# Patient Record
Sex: MALE | Race: WHITE | NOT HISPANIC OR LATINO
[De-identification: names, ages, dates, MRNs, and addresses within clinical notes are randomized per-mention and may not be internally consistent; named-entity substitution may affect disease eponyms.]

---

## 2019-03-21 ENCOUNTER — TRANSCRIPTION ENCOUNTER (OUTPATIENT)
Age: 54
End: 2019-03-21

## 2020-11-17 ENCOUNTER — EMERGENCY (EMERGENCY)
Facility: HOSPITAL | Age: 55
LOS: 1 days | Discharge: HOME | End: 2020-11-17
Admitting: EMERGENCY MEDICINE
Payer: COMMERCIAL

## 2020-11-17 VITALS
TEMPERATURE: 98 F | DIASTOLIC BLOOD PRESSURE: 90 MMHG | OXYGEN SATURATION: 98 % | HEART RATE: 85 BPM | RESPIRATION RATE: 18 BRPM | SYSTOLIC BLOOD PRESSURE: 133 MMHG

## 2020-11-17 PROCEDURE — 99283 EMERGENCY DEPT VISIT LOW MDM: CPT

## 2020-11-17 NOTE — ED PROVIDER NOTE - TEMPLATE
Pt brought to ED by ambulance after being found down by a neighbor. Per EMS, pt left a suicide note and reportedly took one week's worth of medication, including diltiazem and carvedilol. Upon arrival to the ED, his heartrate is 35 and initial BP is 68/45. He is lethargic and minimally responsive.   
Psych/Behavioral

## 2020-11-17 NOTE — ED PROVIDER NOTE - PATIENT PORTAL LINK FT
You can access the FollowMyHealth Patient Portal offered by [facility] by registering at the following website: [email or web address]. By joining [employer]’s FollowMyHealth portal, you will also be able to view your health information using other applications (apps) compatible with our system.

## 2020-11-17 NOTE — ED ADULT NURSE NOTE - OBJECTIVE STATEMENT
Pt reports feeling "desperate."  Pt. recently discharged form [facility] (11/16) and he states that he lied to get discharged but he's not well. Pt. is alert and oriented x3.  He has a follow-up appointment with his psychiatrist on 11/18.  Denies any substance use, alcohol, AH/VH, SI/HI.

## 2020-11-17 NOTE — ED ADULT TRIAGE NOTE - CHIEF COMPLAINT QUOTE
p/t with hx bipolar, c/o of increased depression and SI for few days no plan, p/t appears anxious, cooperative

## 2020-11-17 NOTE — ED PROVIDER NOTE - OBJECTIVE STATEMENT
This is a 55 yr old M, pmh bipolar disorder with c/o anxiety and depression. Pt states he just not feeling well, he is desperate to be admitted again. He endorsees he was discharged from [facility] for inpatient psychiatric unit. He states he fooled them, he told he is feeling better but in fact he does not. He states he went home and realized he is just not himself. Pt endorsees he has an out patient [facility]. Denies SI/HI/AH/VH. Denies falling, punching or kicking any objects. Denies pain, SOB, fever, chills, chest and abdominal discomfort. Denies recent use of alcohol or illicit drug. No evidence of physical injuries.

## 2020-11-17 NOTE — ED PROVIDER NOTE - NSFOLLOWUPINSTRUCTIONS_ED_ALL_ED_FT
Please keep your follow up appointment tomorrow with [facility].     Anxiety    Generalized anxiety disorder (GIFTY) is a mental disorder. It is defined as anxiety that is not necessarily related to specific events or activities or is out of proportion to said events. Symptoms include restlessness, fatigue, difficulty concentrations, irritability and difficulty concentrating. It may interfere with life functions, including relationships, work, and school. If you were started on a medication, make sure to take exactly as prescribed and follow up with a psychiatrist.    SEEK IMMEDIATE MEDICAL CARE IF YOU HAVE ANY OF THE FOLLOWING SYMPTOMS: thoughts about hurting killing yourself, thoughts about hurting or killing somebody else, hallucinations, or worsening depression.    Depression    Depression is a mental illness that usually causes feelings of sadness, hopelessness, or helplessness. Some people with this disorder do not feel particularly sad but lose interest in doing things they used to enjoy. Major depressive disorder also can cause physical symptoms. It can interfere with work, school, relationships, and other normal everyday activities. If you were started on a medication, make sure to take exactly as prescribed and follow up with a psychiatrist.    SEEK IMMEDIATE MEDICAL CARE IF YOU HAVE ANY OF THE FOLLOWING SYMPTOMS: thoughts about hurting or killing yourself, thoughts about hurting or killing somebody else, hallucinations, or worsening depression.

## 2020-11-17 NOTE — ED PROVIDER NOTE - CARE PLAN
Principal Discharge DX:	Anxiety  Secondary Diagnosis:	Depression  Secondary Diagnosis:	Bipolar disorder

## 2020-11-17 NOTE — ED BEHAVIORAL HEALTH NOTE - BEHAVIORAL HEALTH NOTE
As per ROSA Rios, Worker called patient's wife Lynette [phone] and left multiple messages for call back.

## 2020-11-17 NOTE — ED ADULT NURSE REASSESSMENT NOTE - NS ED NURSE REASSESS COMMENT FT1
Pt is being discharged to [facility].  Awaiting daughter for transport.  Pt. received discharge instructions.
16

## 2020-11-17 NOTE — ED BEHAVIORAL HEALTH NOTE - BEHAVIORAL HEALTH NOTE
As per ROSA Rios, Worker called patient’s family for collateral information. Worker called patient’s wife Lynette ([phone]) who then directed worker to call patient’s daughter.  Worker then called patient’s daughter Dede Akins ([phone]).    Patient is a 55 year old female, domiciled with wife and adult children, with a diagnosis of Bipolar disorder and MDD, discharged yesterday at [facility], BIB accompanied by daughter for depression.  Patient has been in and out of the hospital over the course of a couple months. Patient was admitted to [facility] for two weeks in July and discharged at the beginning of August. Patient was at a manic state then and was readmitted to [facility] from the ending of August to Mid-September.  After this discharge patient was in a depressive state and the medication that was prescribed brought him to a depressive state. Patient was hospitalized for suicidal thoughts for a week and a half and was discharged yesterday. Patient was brought back to [facility] where Dr. Tineo instructed him to for to [facility]. Patient presented today with suicidal thoughts to hurt self but had no plan or intent. Patient wanted to go to work today but is currently on leave from work. Patient has no past SA and is not engaging in dangerous behaviors. Patient has not eaten since yesterday afternoon but has been pacing back and forth. Patient has diaibities. Patient was given his injection yesterday while at [facility]. Patient is linked to [facility] and sees a psychiatrist there. Patient received his injection Risperdal ? [facility]. Patient sees a therapist Dr. Bello ([phone]). Case discussed with ROSA Rios. Patient is cleared for discharge. Worker called patient’s daughter and informed that patient is cleared for discharge and is [facility]. Worker informed daughter of patient’s plan for follow up and was also provided with [facility] information. Patient’s daughter will transport patient home. As per ROSA Rios, Worker called patient’s family for collateral information. Worker called patient’s wife Lynette ([phone]) who then directed worker to call patient’s daughter.  Worker then called patient’s daughter Dede Akins ([phone]).    Patient is a 55 year old female, domiciled with wife and adult children, with a diagnosis of Bipolar disorder and MDD, discharged yesterday at [facility], BIB accompanied by daughter for depression.  Patient has been in and out of the hospital over the course of a couple months. Patient was admitted to [facility] for two weeks in July and discharged at the beginning of August. Patient was at a manic state then and was readmitted to [facility] from the ending of August to Mid-September.  After this discharge patient was in a depressive state and the medication that was prescribed brought him to a depressive state. Patient was hospitalized for suicidal thoughts for a week and a half and was discharged yesterday. Patient was brought back to [facility] where Dr. Tineo instructed him to for to [facility]. Patient presented today with suicidal thoughts to hurt self but had no plan or intent. Patient wanted to go to work today but is currently on leave from work. Patient has no past SA and is not engaging in dangerous behaviors. Patient has not eaten since yesterday afternoon but has been pacing back and forth. Patient has diaibities. Patient was given his injection yesterday while at [facility]. Patient is linked to [facility] and sees a psychiatrist there. Patient received his injection Risperdal ? [facility] Patient sees a therapist Dr. Bello ([phone]). Case discussed with ROSA Rios. Patient is cleared for discharge. Worker called patient’s daughter and informed that patient is cleared for discharge and is [facility]. Worker informed daughter of patient’s plan for follow up and was also provided with [facility] information. Patient’s daughter will transport patient home. Worker met daughter outside and spoke with patient and recommended that patient follow up with his psychiatrist.

## 2020-12-02 ENCOUNTER — INPATIENT (INPATIENT)
Facility: HOSPITAL | Age: 55
LOS: 11 days | Discharge: HOME | End: 2020-12-14
Attending: PSYCHIATRY & NEUROLOGY | Admitting: PSYCHIATRY & NEUROLOGY
Payer: COMMERCIAL

## 2020-12-02 VITALS
OXYGEN SATURATION: 97 % | SYSTOLIC BLOOD PRESSURE: 136 MMHG | TEMPERATURE: 97 F | RESPIRATION RATE: 16 BRPM | HEIGHT: 67 IN | HEART RATE: 100 BPM | DIASTOLIC BLOOD PRESSURE: 96 MMHG

## 2020-12-02 DIAGNOSIS — F31.4 BIPOLAR DISORDER, CURRENT EPISODE DEPRESSED, SEVERE, WITHOUT PSYCHOTIC FEATURES: ICD-10-CM

## 2020-12-02 LAB
AMPHET UR-MCNC: NEGATIVE — SIGNIFICANT CHANGE UP
ANION GAP SERPL CALC-SCNC: 18 MMO/L — HIGH (ref 7–14)
APAP SERPL-MCNC: < 15 UG/ML — LOW (ref 15–25)
APPEARANCE UR: CLEAR — SIGNIFICANT CHANGE UP
AST SERPL-CCNC: 18 U/L — SIGNIFICANT CHANGE UP (ref 4–40)
BARBITURATES UR SCN-MCNC: NEGATIVE — SIGNIFICANT CHANGE UP
BASOPHILS # BLD AUTO: 0.01 K/UL — SIGNIFICANT CHANGE UP (ref 0–0.2)
BASOPHILS NFR BLD AUTO: 0.2 % — SIGNIFICANT CHANGE UP (ref 0–2)
BENZODIAZ UR-MCNC: NEGATIVE — SIGNIFICANT CHANGE UP
BILIRUB SERPL-MCNC: 0.9 MG/DL — SIGNIFICANT CHANGE UP (ref 0.2–1.2)
BILIRUB UR-MCNC: NEGATIVE — SIGNIFICANT CHANGE UP
BLOOD UR QL VISUAL: NEGATIVE — SIGNIFICANT CHANGE UP
BUN SERPL-MCNC: 8 MG/DL — SIGNIFICANT CHANGE UP (ref 7–23)
CALCIUM SERPL-MCNC: 9.8 MG/DL — SIGNIFICANT CHANGE UP (ref 8.4–10.5)
CANNABINOIDS UR-MCNC: NEGATIVE — SIGNIFICANT CHANGE UP
CHLORIDE SERPL-SCNC: 98 MMOL/L — SIGNIFICANT CHANGE UP (ref 98–107)
CO2 SERPL-SCNC: 19 MMOL/L — LOW (ref 22–31)
COCAINE METAB.OTHER UR-MCNC: NEGATIVE — SIGNIFICANT CHANGE UP
COLOR SPEC: COLORLESS — SIGNIFICANT CHANGE UP
EOSINOPHIL # BLD AUTO: 0.13 K/UL — SIGNIFICANT CHANGE UP (ref 0–0.5)
EOSINOPHIL NFR BLD AUTO: 2.7 % — SIGNIFICANT CHANGE UP (ref 0–6)
ETHANOL BLD-MCNC: < 10 MG/DL — SIGNIFICANT CHANGE UP
GLUCOSE SERPL-MCNC: 149 MG/DL — HIGH (ref 70–99)
GLUCOSE UR-MCNC: NEGATIVE — SIGNIFICANT CHANGE UP
HCT VFR BLD CALC: 40.2 % — SIGNIFICANT CHANGE UP (ref 39–50)
HGB BLD-MCNC: 14.1 G/DL — SIGNIFICANT CHANGE UP (ref 13–17)
IMM GRANULOCYTES NFR BLD AUTO: 0.4 % — SIGNIFICANT CHANGE UP (ref 0–1.5)
KETONES UR-MCNC: NEGATIVE — SIGNIFICANT CHANGE UP
LEUKOCYTE ESTERASE UR-ACNC: NEGATIVE — SIGNIFICANT CHANGE UP
LYMPHOCYTES # BLD AUTO: 0.95 K/UL — LOW (ref 1–3.3)
LYMPHOCYTES # BLD AUTO: 19.7 % — SIGNIFICANT CHANGE UP (ref 13–44)
MCHC RBC-ENTMCNC: 28.7 PG — SIGNIFICANT CHANGE UP (ref 27–34)
MCHC RBC-ENTMCNC: 35.1 % — SIGNIFICANT CHANGE UP (ref 32–36)
MCV RBC AUTO: 81.9 FL — SIGNIFICANT CHANGE UP (ref 80–100)
METHADONE UR-MCNC: NEGATIVE — SIGNIFICANT CHANGE UP
MONOCYTES # BLD AUTO: 0.41 K/UL — SIGNIFICANT CHANGE UP (ref 0–0.9)
MONOCYTES NFR BLD AUTO: 8.5 % — SIGNIFICANT CHANGE UP (ref 2–14)
NEUTROPHILS # BLD AUTO: 3.3 K/UL — SIGNIFICANT CHANGE UP (ref 1.8–7.4)
NEUTROPHILS NFR BLD AUTO: 68.5 % — SIGNIFICANT CHANGE UP (ref 43–77)
NITRITE UR-MCNC: NEGATIVE — SIGNIFICANT CHANGE UP
NRBC # FLD: 0 K/UL — SIGNIFICANT CHANGE UP (ref 0–0)
OPIATES UR-MCNC: NEGATIVE — SIGNIFICANT CHANGE UP
OXYCODONE UR-MCNC: NEGATIVE — SIGNIFICANT CHANGE UP
PCP UR-MCNC: NEGATIVE — SIGNIFICANT CHANGE UP
PH UR: 6.5 — SIGNIFICANT CHANGE UP (ref 5–8)
PLATELET # BLD AUTO: 156 K/UL — SIGNIFICANT CHANGE UP (ref 150–400)
PMV BLD: 9.9 FL — SIGNIFICANT CHANGE UP (ref 7–13)
POTASSIUM SERPL-MCNC: 3.7 MMOL/L — SIGNIFICANT CHANGE UP (ref 3.5–5.3)
POTASSIUM SERPL-SCNC: 3.7 MMOL/L — SIGNIFICANT CHANGE UP (ref 3.5–5.3)
PROT SERPL-MCNC: 6.9 G/DL — SIGNIFICANT CHANGE UP (ref 6–8.3)
PROT UR-MCNC: NEGATIVE — SIGNIFICANT CHANGE UP
RBC # BLD: 4.91 M/UL — SIGNIFICANT CHANGE UP (ref 4.2–5.8)
RBC # FLD: 13.7 % — SIGNIFICANT CHANGE UP (ref 10.3–14.5)
SALICYLATES SERPL-MCNC: < 5 MG/DL — LOW (ref 15–30)
SODIUM SERPL-SCNC: 135 MMOL/L — SIGNIFICANT CHANGE UP (ref 135–145)
SP GR SPEC: 1 — LOW (ref 1–1.04)
UROBILINOGEN FLD QL: NORMAL — SIGNIFICANT CHANGE UP
WBC # BLD: 4.82 K/UL — SIGNIFICANT CHANGE UP (ref 3.8–10.5)
WBC # FLD AUTO: 4.82 K/UL — SIGNIFICANT CHANGE UP (ref 3.8–10.5)

## 2020-12-02 PROCEDURE — 99285 EMERGENCY DEPT VISIT HI MDM: CPT

## 2020-12-02 RX ORDER — BENZTROPINE MESYLATE 1 MG
1 TABLET ORAL AT BEDTIME
Refills: 0 | Status: DISCONTINUED | OUTPATIENT
Start: 2020-12-03 | End: 2020-12-04

## 2020-12-02 RX ORDER — QUETIAPINE FUMARATE 200 MG/1
300 TABLET, FILM COATED ORAL AT BEDTIME
Refills: 0 | Status: DISCONTINUED | OUTPATIENT
Start: 2020-12-03 | End: 2020-12-14

## 2020-12-02 RX ORDER — LAMOTRIGINE 25 MG/1
300 TABLET, ORALLY DISINTEGRATING ORAL AT BEDTIME
Refills: 0 | Status: DISCONTINUED | OUTPATIENT
Start: 2020-12-03 | End: 2020-12-04

## 2020-12-02 RX ORDER — METFORMIN HYDROCHLORIDE 850 MG/1
500 TABLET ORAL
Refills: 0 | Status: DISCONTINUED | OUTPATIENT
Start: 2020-12-03 | End: 2020-12-14

## 2020-12-02 NOTE — ED PROVIDER NOTE - CLINICAL SUMMARY MEDICAL DECISION MAKING FREE TEXT BOX
This is a 55 yr old M, pmh bipolar disorder, depression, DM, with c/o anxiety, sever depression and SI. Pt states he is unable to eat drink , urinate, and very constipated. He feels depress and si "like the whole 9 yard". Pt was here not too long ago with similar c/o after been discharged from .   Abd- soft, non tender, no guarding, positive bowl sounds.   Labs, abd xray, psych

## 2020-12-02 NOTE — ED BEHAVIORAL HEALTH ASSESSMENT NOTE - PSYCHIATRIC ISSUES AND PLAN (INCLUDE STANDING AND PRN MEDICATION)
Defer medication changes to inpatient team; continue Lamictal 300 mg po QHS, Seroquel 300 mg po QHS, Cogentin 1 mg po QHS, Buspar 30 mg po BID, Invega Sustenna 117 mg IM q4 weeks (last received 11/16/20) Defer medication changes to inpatient team; continue Lamictal 300 mg po QHS, Seroquel 300 mg po QHS, Cogentin 1 mg po QHS, Buspar 30 mg po BID, Invega Sustenna 117 mg IM qmonthly weeks (last received 11/16/20)

## 2020-12-02 NOTE — ED BEHAVIORAL HEALTH ASSESSMENT NOTE - HPI (INCLUDE ILLNESS QUALITY, SEVERITY, DURATION, TIMING, CONTEXT, MODIFYING FACTORS, ASSOCIATED SIGNS AND SYMPTOMS)
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    On interview, pt appears anxious and is shaking his leg up and down. He reports increased depression and anxiety for the past month. He denies acute stressors. He reports feeling restless, pacing, and he is somatically preoccupied. He reports barely eating and is sleeping poorly. He reports difficulty focusing, low energy, anhedonia, and hopelessness. For the past few weeks, he reports suicidal ideation with thoughts of jumping in front of a train or drowning himself. He reports ambivalent intent. He denies manic or psychotic symptoms. He denies homicidal or violent ideation, intent, or plan. He reports medication compliance, denies substance use.

## 2020-12-02 NOTE — ED BEHAVIORAL HEALTH ASSESSMENT NOTE - DESCRIPTION
anxious, in good behavioral control  Vital Signs Last 24 Hrs  T(C): 36.2 (02 Dec 2020 21:33), Max: 36.2 (02 Dec 2020 21:33)  T(F): 97.1 (02 Dec 2020 21:33), Max: 97.1 (02 Dec 2020 21:33)  HR: 100 (02 Dec 2020 21:33) (100 - 100)  BP: 136/96 (02 Dec 2020 21:33) (136/96 - 136/96)  BP(mean): --  RR: 16 (02 Dec 2020 21:33) (16 - 16)  SpO2: 97% (02 Dec 2020 21:33) (97% - 97%) Type II DM see HPI

## 2020-12-02 NOTE — ED PROVIDER NOTE - OBJECTIVE STATEMENT
This is a 55 yr old M, pmh bipolar disorder, depression, DM, with c/o anxiety, sever depression and SI. Pt states he is unable to eat drink , urinate, and very constipated. He feels depress and si "like the whole 9 yard". Pt was here not too long ago with similar c/o after been discharged from .

## 2020-12-02 NOTE — ED BEHAVIORAL HEALTH ASSESSMENT NOTE - CURRENT MEDICATION
Lamictal 300 mg po QHS, Seroquel 300 mg po QHS, Cogentin 1 mg po QHS, Buspar 30 mg po BID, Invega Sustenna 117 mg IM q4 weeks (last received 11/16/20), Metformin 500 mg po BID Lamictal 300 mg po QHS, Seroquel 300 mg po QHS, Cogentin 1 mg po QHS, Buspar 30 mg po BID, Invega Sustenna 117 mg IM qmonthly (last received 11/16/20), Metformin 500 mg po BID

## 2020-12-02 NOTE — ED BEHAVIORAL HEALTH ASSESSMENT NOTE - SUMMARY
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 Pt presents an acute danger to self and requires inpatient psychiatric admission for safety and stabilization.

## 2020-12-02 NOTE — ED ADULT NURSE NOTE - OBJECTIVE STATEMENT
Pt received to . Pt presents calm and cooperative; states he has been feeling increasingly depressed to the point where he has to force himself to eat something, lacks motivation and has no interest even in his musical abilities which he formally enjoyed very much; pt also endorsing SI citing he was "thinking about jumping in front of a train". Pt denies HI. Pts belongings secured for safety. Pt awaiting psychiatric evaluation.

## 2020-12-02 NOTE — ED ADULT TRIAGE NOTE - CHIEF COMPLAINT QUOTE
pt c/o suicidal thoughts and worsening depression and anxiety x1 month. PMH- Bipolar disorder, compliant with meds. pt anxious in triage. Mabel Akins- wife [phone].

## 2020-12-02 NOTE — ED BEHAVIORAL HEALTH ASSESSMENT NOTE - SUICIDE RISK FACTORS
Anhedonia/Mood Disorder current/past/Hopelessness or despair/Access to lethal methods (pills, firearm, etc.: Ask specifically about presence or absence of a firearm in the home or ease of accessing/Current mood episode/Agitation/Severe Anxiety/Panic/Unable to engage in safety planning/Insomnia

## 2020-12-02 NOTE — ED ADULT NURSE NOTE - NSIMPLEMENTINTERV_GEN_ALL_ED
Implemented All Universal Safety Interventions:  [place] to call system. Call bell, personal items and telephone within reach. Instruct patient to call for assistance. Room bathroom lighting operational. Non-slip footwear when patient is off stretcher. Physically safe environment: no spills, clutter or unnecessary equipment. Stretcher in lowest position, wheels locked, appropriate side rails in place.

## 2020-12-02 NOTE — ED ADULT NURSE NOTE - CHIEF COMPLAINT QUOTE
pt c/o suicidal thoughts and worsening depression and anxiety x1 month. PMH- Bipolar disorder, compliant with meds. pt anxious in triage. Mabel Akins- wife [phone].

## 2020-12-03 DIAGNOSIS — F31.9 BIPOLAR DISORDER, UNSPECIFIED: ICD-10-CM

## 2020-12-03 PROBLEM — E11.9 TYPE 2 DIABETES MELLITUS WITHOUT COMPLICATIONS: Chronic | Status: ACTIVE | Noted: 2020-11-17

## 2020-12-03 PROBLEM — F32.9 MAJOR DEPRESSIVE DISORDER, SINGLE EPISODE, UNSPECIFIED: Chronic | Status: ACTIVE | Noted: 2020-11-17

## 2020-12-03 LAB
ALBUMIN SERPL ELPH-MCNC: 4.9 G/DL — SIGNIFICANT CHANGE UP (ref 3.3–5)
ALP SERPL-CCNC: 54 U/L — SIGNIFICANT CHANGE UP (ref 40–120)
ALT FLD-CCNC: 23 U/L — SIGNIFICANT CHANGE UP (ref 4–41)
B PERT DNA SPEC QL NAA+PROBE: SIGNIFICANT CHANGE UP
C PNEUM DNA SPEC QL NAA+PROBE: SIGNIFICANT CHANGE UP
CREAT SERPL-MCNC: 0.89 MG/DL — SIGNIFICANT CHANGE UP (ref 0.5–1.3)
FLUAV H1 2009 PAND RNA SPEC QL NAA+PROBE: SIGNIFICANT CHANGE UP
FLUAV H1 RNA SPEC QL NAA+PROBE: SIGNIFICANT CHANGE UP
FLUAV H3 RNA SPEC QL NAA+PROBE: SIGNIFICANT CHANGE UP
FLUAV SUBTYP SPEC NAA+PROBE: SIGNIFICANT CHANGE UP
FLUBV RNA SPEC QL NAA+PROBE: SIGNIFICANT CHANGE UP
GLUCOSE BLDC GLUCOMTR-MCNC: 131 MG/DL — HIGH (ref 70–99)
HADV DNA SPEC QL NAA+PROBE: SIGNIFICANT CHANGE UP
HCOV PNL SPEC NAA+PROBE: SIGNIFICANT CHANGE UP
HMPV RNA SPEC QL NAA+PROBE: SIGNIFICANT CHANGE UP
HPIV1 RNA SPEC QL NAA+PROBE: SIGNIFICANT CHANGE UP
HPIV2 RNA SPEC QL NAA+PROBE: SIGNIFICANT CHANGE UP
HPIV3 RNA SPEC QL NAA+PROBE: SIGNIFICANT CHANGE UP
HPIV4 RNA SPEC QL NAA+PROBE: SIGNIFICANT CHANGE UP
RAPID RVP RESULT: SIGNIFICANT CHANGE UP
RSV RNA SPEC QL NAA+PROBE: SIGNIFICANT CHANGE UP
RV+EV RNA SPEC QL NAA+PROBE: SIGNIFICANT CHANGE UP
SARS-COV-2 IGG SERPL QL IA: NEGATIVE — SIGNIFICANT CHANGE UP
SARS-COV-2 IGM SERPL IA-ACNC: <0.1 INDEX — SIGNIFICANT CHANGE UP
SARS-COV-2 RNA SPEC QL NAA+PROBE: SIGNIFICANT CHANGE UP
TSH SERPL-MCNC: 1.45 UIU/ML — SIGNIFICANT CHANGE UP (ref 0.27–4.2)

## 2020-12-03 PROCEDURE — 99222 1ST HOSP IP/OBS MODERATE 55: CPT | Mod: GC

## 2020-12-03 PROCEDURE — 74021 RADEX ABDOMEN 3+ VIEWS: CPT | Mod: 26

## 2020-12-03 PROCEDURE — 99285 EMERGENCY DEPT VISIT HI MDM: CPT | Mod: 25

## 2020-12-03 PROCEDURE — 93010 ELECTROCARDIOGRAM REPORT: CPT

## 2020-12-03 RX ORDER — QUETIAPINE FUMARATE 200 MG/1
300 TABLET, FILM COATED ORAL ONCE
Refills: 0 | Status: COMPLETED | OUTPATIENT
Start: 2020-12-03 | End: 2020-12-03

## 2020-12-03 RX ORDER — SENNA PLUS 8.6 MG/1
2 TABLET ORAL AT BEDTIME
Refills: 0 | Status: DISCONTINUED | OUTPATIENT
Start: 2020-12-03 | End: 2020-12-14

## 2020-12-03 RX ORDER — INFLUENZA VIRUS VACCINE 15; 15; 15; 15 UG/.5ML; UG/.5ML; UG/.5ML; UG/.5ML
0.5 SUSPENSION INTRAMUSCULAR ONCE
Refills: 0 | Status: COMPLETED | OUTPATIENT
Start: 2020-12-03 | End: 2020-12-03

## 2020-12-03 RX ORDER — POLYETHYLENE GLYCOL 3350 17 G/17G
17 POWDER, FOR SOLUTION ORAL DAILY
Refills: 0 | Status: DISCONTINUED | OUTPATIENT
Start: 2020-12-03 | End: 2020-12-07

## 2020-12-03 RX ORDER — PNEUMOCOCCAL 23-VAL P-SAC VAC 25MCG/0.5
0.5 VIAL (ML) INJECTION ONCE
Refills: 0 | Status: COMPLETED | OUTPATIENT
Start: 2020-12-03 | End: 2020-12-03

## 2020-12-03 RX ADMIN — METFORMIN HYDROCHLORIDE 500 MILLIGRAM(S): 850 TABLET ORAL at 20:49

## 2020-12-03 RX ADMIN — SENNA PLUS 2 TABLET(S): 8.6 TABLET ORAL at 20:50

## 2020-12-03 RX ADMIN — Medication 1 MILLIGRAM(S): at 20:49

## 2020-12-03 RX ADMIN — METFORMIN HYDROCHLORIDE 500 MILLIGRAM(S): 850 TABLET ORAL at 08:49

## 2020-12-03 RX ADMIN — Medication 30 MILLIGRAM(S): at 08:49

## 2020-12-03 RX ADMIN — Medication 2 MILLIGRAM(S): at 08:49

## 2020-12-03 RX ADMIN — QUETIAPINE FUMARATE 300 MILLIGRAM(S): 200 TABLET, FILM COATED ORAL at 04:51

## 2020-12-03 RX ADMIN — INFLUENZA VIRUS VACCINE 0.5 MILLILITER(S): 15; 15; 15; 15 SUSPENSION INTRAMUSCULAR at 17:56

## 2020-12-03 RX ADMIN — LAMOTRIGINE 300 MILLIGRAM(S): 25 TABLET, ORALLY DISINTEGRATING ORAL at 20:49

## 2020-12-03 RX ADMIN — Medication 0.5 MILLILITER(S): at 17:59

## 2020-12-03 RX ADMIN — Medication 30 MILLIGRAM(S): at 20:49

## 2020-12-04 LAB — GLUCOSE BLDC GLUCOMTR-MCNC: 143 MG/DL — HIGH (ref 70–99)

## 2020-12-04 PROCEDURE — 99232 SBSQ HOSP IP/OBS MODERATE 35: CPT | Mod: GC

## 2020-12-04 RX ORDER — DIVALPROEX SODIUM 500 MG/1
1000 TABLET, DELAYED RELEASE ORAL AT BEDTIME
Refills: 0 | Status: DISCONTINUED | OUTPATIENT
Start: 2020-12-04 | End: 2020-12-04

## 2020-12-04 RX ORDER — BENZTROPINE MESYLATE 1 MG
0.5 TABLET ORAL AT BEDTIME
Refills: 0 | Status: DISCONTINUED | OUTPATIENT
Start: 2020-12-04 | End: 2020-12-07

## 2020-12-04 RX ORDER — LAMOTRIGINE 25 MG/1
150 TABLET, ORALLY DISINTEGRATING ORAL AT BEDTIME
Refills: 0 | Status: DISCONTINUED | OUTPATIENT
Start: 2020-12-04 | End: 2020-12-14

## 2020-12-04 RX ORDER — CLONAZEPAM 1 MG
0.5 TABLET ORAL
Refills: 0 | Status: DISCONTINUED | OUTPATIENT
Start: 2020-12-04 | End: 2020-12-08

## 2020-12-04 RX ADMIN — LAMOTRIGINE 150 MILLIGRAM(S): 25 TABLET, ORALLY DISINTEGRATING ORAL at 21:15

## 2020-12-04 RX ADMIN — SENNA PLUS 2 TABLET(S): 8.6 TABLET ORAL at 21:16

## 2020-12-04 RX ADMIN — METFORMIN HYDROCHLORIDE 500 MILLIGRAM(S): 850 TABLET ORAL at 21:16

## 2020-12-04 RX ADMIN — QUETIAPINE FUMARATE 300 MILLIGRAM(S): 200 TABLET, FILM COATED ORAL at 21:16

## 2020-12-04 RX ADMIN — Medication 0.5 MILLIGRAM(S): at 21:15

## 2020-12-04 RX ADMIN — METFORMIN HYDROCHLORIDE 500 MILLIGRAM(S): 850 TABLET ORAL at 08:00

## 2020-12-04 RX ADMIN — Medication 2 MILLIGRAM(S): at 02:30

## 2020-12-04 RX ADMIN — Medication 30 MILLIGRAM(S): at 08:00

## 2020-12-04 RX ADMIN — Medication 0.5 MILLIGRAM(S): at 12:29

## 2020-12-04 RX ADMIN — Medication 10 MILLIGRAM(S): at 21:15

## 2020-12-05 LAB
CHOLEST SERPL-MCNC: 137 MG/DL — SIGNIFICANT CHANGE UP (ref 120–199)
DIRECT LDL: 83 MG/DL — SIGNIFICANT CHANGE UP
GLUCOSE BLDC GLUCOMTR-MCNC: 128 MG/DL — HIGH (ref 70–99)
GLUCOSE BLDC GLUCOMTR-MCNC: 158 MG/DL — HIGH (ref 70–99)
HBA1C BLD-MCNC: 7 % — HIGH (ref 4–5.6)
HDLC SERPL-MCNC: 41 MG/DL — SIGNIFICANT CHANGE UP (ref 35–55)
TRIGL SERPL-MCNC: 179 MG/DL — HIGH (ref 10–149)

## 2020-12-05 PROCEDURE — 99232 SBSQ HOSP IP/OBS MODERATE 35: CPT

## 2020-12-05 RX ORDER — INSULIN LISPRO 100/ML
VIAL (ML) SUBCUTANEOUS AT BEDTIME
Refills: 0 | Status: DISCONTINUED | OUTPATIENT
Start: 2020-12-05 | End: 2020-12-11

## 2020-12-05 RX ORDER — DEXTROSE 50 % IN WATER 50 %
15 SYRINGE (ML) INTRAVENOUS ONCE
Refills: 0 | Status: DISCONTINUED | OUTPATIENT
Start: 2020-12-05 | End: 2020-12-11

## 2020-12-05 RX ORDER — GLUCAGON INJECTION, SOLUTION 0.5 MG/.1ML
1 INJECTION, SOLUTION SUBCUTANEOUS ONCE
Refills: 0 | Status: DISCONTINUED | OUTPATIENT
Start: 2020-12-05 | End: 2020-12-11

## 2020-12-05 RX ORDER — SODIUM CHLORIDE 9 MG/ML
1000 INJECTION, SOLUTION INTRAVENOUS
Refills: 0 | Status: DISCONTINUED | OUTPATIENT
Start: 2020-12-05 | End: 2020-12-07

## 2020-12-05 RX ORDER — INSULIN LISPRO 100/ML
VIAL (ML) SUBCUTANEOUS
Refills: 0 | Status: DISCONTINUED | OUTPATIENT
Start: 2020-12-05 | End: 2020-12-11

## 2020-12-05 RX ADMIN — METFORMIN HYDROCHLORIDE 500 MILLIGRAM(S): 850 TABLET ORAL at 08:58

## 2020-12-05 RX ADMIN — Medication 0.5 MILLIGRAM(S): at 21:02

## 2020-12-05 RX ADMIN — SENNA PLUS 2 TABLET(S): 8.6 TABLET ORAL at 21:02

## 2020-12-05 RX ADMIN — Medication 10 MILLIGRAM(S): at 21:01

## 2020-12-05 RX ADMIN — Medication 0.5 MILLIGRAM(S): at 08:58

## 2020-12-05 RX ADMIN — LAMOTRIGINE 150 MILLIGRAM(S): 25 TABLET, ORALLY DISINTEGRATING ORAL at 21:02

## 2020-12-05 RX ADMIN — Medication 0.5 MILLIGRAM(S): at 21:01

## 2020-12-05 RX ADMIN — Medication 10 MILLIGRAM(S): at 08:58

## 2020-12-05 RX ADMIN — METFORMIN HYDROCHLORIDE 500 MILLIGRAM(S): 850 TABLET ORAL at 21:02

## 2020-12-05 RX ADMIN — QUETIAPINE FUMARATE 300 MILLIGRAM(S): 200 TABLET, FILM COATED ORAL at 21:02

## 2020-12-06 LAB
GLUCOSE BLDC GLUCOMTR-MCNC: 118 MG/DL — HIGH (ref 70–99)
GLUCOSE BLDC GLUCOMTR-MCNC: 119 MG/DL — HIGH (ref 70–99)
GLUCOSE BLDC GLUCOMTR-MCNC: 144 MG/DL — HIGH (ref 70–99)
GLUCOSE BLDC GLUCOMTR-MCNC: 241 MG/DL — HIGH (ref 70–99)

## 2020-12-06 PROCEDURE — 99232 SBSQ HOSP IP/OBS MODERATE 35: CPT

## 2020-12-06 RX ADMIN — Medication 0.5 MILLIGRAM(S): at 20:27

## 2020-12-06 RX ADMIN — Medication 2: at 11:21

## 2020-12-06 RX ADMIN — QUETIAPINE FUMARATE 300 MILLIGRAM(S): 200 TABLET, FILM COATED ORAL at 20:27

## 2020-12-06 RX ADMIN — SENNA PLUS 2 TABLET(S): 8.6 TABLET ORAL at 20:27

## 2020-12-06 RX ADMIN — METFORMIN HYDROCHLORIDE 500 MILLIGRAM(S): 850 TABLET ORAL at 20:27

## 2020-12-06 RX ADMIN — Medication 0.5 MILLIGRAM(S): at 08:25

## 2020-12-06 RX ADMIN — LAMOTRIGINE 150 MILLIGRAM(S): 25 TABLET, ORALLY DISINTEGRATING ORAL at 20:27

## 2020-12-06 RX ADMIN — Medication 10 MILLIGRAM(S): at 08:25

## 2020-12-06 RX ADMIN — METFORMIN HYDROCHLORIDE 500 MILLIGRAM(S): 850 TABLET ORAL at 08:25

## 2020-12-06 RX ADMIN — Medication 10 MILLIGRAM(S): at 20:27

## 2020-12-07 LAB
GLUCOSE BLDC GLUCOMTR-MCNC: 127 MG/DL — HIGH (ref 70–99)
GLUCOSE BLDC GLUCOMTR-MCNC: 133 MG/DL — HIGH (ref 70–99)
GLUCOSE BLDC GLUCOMTR-MCNC: 162 MG/DL — HIGH (ref 70–99)

## 2020-12-07 PROCEDURE — 99232 SBSQ HOSP IP/OBS MODERATE 35: CPT | Mod: GC

## 2020-12-07 RX ORDER — DIVALPROEX SODIUM 500 MG/1
1000 TABLET, DELAYED RELEASE ORAL AT BEDTIME
Refills: 0 | Status: DISCONTINUED | OUTPATIENT
Start: 2020-12-07 | End: 2020-12-14

## 2020-12-07 RX ORDER — POLYETHYLENE GLYCOL 3350 17 G/17G
17 POWDER, FOR SOLUTION ORAL DAILY
Refills: 0 | Status: DISCONTINUED | OUTPATIENT
Start: 2020-12-07 | End: 2020-12-08

## 2020-12-07 RX ADMIN — METFORMIN HYDROCHLORIDE 500 MILLIGRAM(S): 850 TABLET ORAL at 08:16

## 2020-12-07 RX ADMIN — QUETIAPINE FUMARATE 300 MILLIGRAM(S): 200 TABLET, FILM COATED ORAL at 20:19

## 2020-12-07 RX ADMIN — Medication 0.5 MILLIGRAM(S): at 08:15

## 2020-12-07 RX ADMIN — LAMOTRIGINE 150 MILLIGRAM(S): 25 TABLET, ORALLY DISINTEGRATING ORAL at 20:19

## 2020-12-07 RX ADMIN — METFORMIN HYDROCHLORIDE 500 MILLIGRAM(S): 850 TABLET ORAL at 20:19

## 2020-12-07 RX ADMIN — DIVALPROEX SODIUM 1000 MILLIGRAM(S): 500 TABLET, DELAYED RELEASE ORAL at 20:19

## 2020-12-07 RX ADMIN — SENNA PLUS 2 TABLET(S): 8.6 TABLET ORAL at 20:19

## 2020-12-07 RX ADMIN — POLYETHYLENE GLYCOL 3350 17 GRAM(S): 17 POWDER, FOR SOLUTION ORAL at 16:50

## 2020-12-07 RX ADMIN — Medication 0.5 MILLIGRAM(S): at 20:19

## 2020-12-07 RX ADMIN — Medication 10 MILLIGRAM(S): at 08:15

## 2020-12-08 DIAGNOSIS — E11.9 TYPE 2 DIABETES MELLITUS WITHOUT COMPLICATIONS: ICD-10-CM

## 2020-12-08 LAB
GLUCOSE BLDC GLUCOMTR-MCNC: 118 MG/DL — HIGH (ref 70–99)
GLUCOSE BLDC GLUCOMTR-MCNC: 132 MG/DL — HIGH (ref 70–99)
GLUCOSE BLDC GLUCOMTR-MCNC: 134 MG/DL — HIGH (ref 70–99)
GLUCOSE BLDC GLUCOMTR-MCNC: 176 MG/DL — HIGH (ref 70–99)

## 2020-12-08 PROCEDURE — 99232 SBSQ HOSP IP/OBS MODERATE 35: CPT

## 2020-12-08 RX ORDER — MAGNESIUM HYDROXIDE 400 MG/1
30 TABLET, CHEWABLE ORAL DAILY
Refills: 0 | Status: DISCONTINUED | OUTPATIENT
Start: 2020-12-08 | End: 2020-12-14

## 2020-12-08 RX ORDER — CLONAZEPAM 1 MG
0.5 TABLET ORAL
Refills: 0 | Status: DISCONTINUED | OUTPATIENT
Start: 2020-12-08 | End: 2020-12-14

## 2020-12-08 RX ORDER — POLYETHYLENE GLYCOL 3350 17 G/17G
17 POWDER, FOR SOLUTION ORAL
Refills: 0 | Status: DISCONTINUED | OUTPATIENT
Start: 2020-12-08 | End: 2020-12-14

## 2020-12-08 RX ADMIN — POLYETHYLENE GLYCOL 3350 17 GRAM(S): 17 POWDER, FOR SOLUTION ORAL at 08:51

## 2020-12-08 RX ADMIN — QUETIAPINE FUMARATE 300 MILLIGRAM(S): 200 TABLET, FILM COATED ORAL at 20:44

## 2020-12-08 RX ADMIN — LAMOTRIGINE 150 MILLIGRAM(S): 25 TABLET, ORALLY DISINTEGRATING ORAL at 20:44

## 2020-12-08 RX ADMIN — METFORMIN HYDROCHLORIDE 500 MILLIGRAM(S): 850 TABLET ORAL at 20:44

## 2020-12-08 RX ADMIN — POLYETHYLENE GLYCOL 3350 17 GRAM(S): 17 POWDER, FOR SOLUTION ORAL at 20:45

## 2020-12-08 RX ADMIN — METFORMIN HYDROCHLORIDE 500 MILLIGRAM(S): 850 TABLET ORAL at 08:50

## 2020-12-08 RX ADMIN — Medication 0.5 MILLIGRAM(S): at 20:44

## 2020-12-08 RX ADMIN — MAGNESIUM HYDROXIDE 30 MILLILITER(S): 400 TABLET, CHEWABLE ORAL at 15:08

## 2020-12-08 RX ADMIN — DIVALPROEX SODIUM 1000 MILLIGRAM(S): 500 TABLET, DELAYED RELEASE ORAL at 20:44

## 2020-12-08 RX ADMIN — Medication 0.5 MILLIGRAM(S): at 08:50

## 2020-12-08 RX ADMIN — SENNA PLUS 2 TABLET(S): 8.6 TABLET ORAL at 20:44

## 2020-12-08 NOTE — BH PATIENT PROFILE - HOME MEDICATIONS
metFORMIN 500 mg oral tablet , 1 tab(s) orally 2 times a day  BuSpar , 30 milligram(s) orally 2 times a day  Invega Sustenna 117 mg/0.75 mL intramuscular suspension, extended release , 1  intramuscular  Cogentin 1 mg oral tablet , 1 tab(s) orally once a day (at bedtime)  SEROquel 300 mg oral tablet , 1 tab(s) orally once a day (at bedtime)  LaMICtal , 300 milligram(s) orally once a day (at bedtime)

## 2020-12-08 NOTE — BH INPATIENT PSYCHIATRY PROGRESS NOTE - NSBHFUPINTERVALHXFT_PSY_A_CORE
No acute events overnight. Complaint with meds and slept well. Showered and shaved yesterday. Appetite good and pt eating all meals. Pt reports improved mood today and appears somewhat brighter. Denies SI/I/P. Remains mostly isolative to his room though did attend one group yesterday. Remains in touch with family. Continues to exhibit some restlessness, though reports some improvement. Remains somewhat anxious.

## 2020-12-08 NOTE — BH INPATIENT PSYCHIATRY PROGRESS NOTE - NSBHADMITIPREASONDETAILS_PSY_A_CORE FT
Depression improved today and pt appears brighter. Making more of an effort to engage in therapeutic activities on the unit.   1. S/p Ivega Sustenna 117mg on 11/16/20. Given akathisia and pt preference will skip next dose and d/c. Treat akathisia for the time being with Propranolol 10mg BID and Klonopin 0.5mg BID (which will also address anxiety). 2. C/w Depakote ER 1000mg qhs (will level 12/10/20) and Lamictal 150mg (at half-home dose due to P450 interaction) 3. C/w Seroquel 300mg qhs for bipolar depression (recently started)

## 2020-12-08 NOTE — BH INPATIENT PSYCHIATRY PROGRESS NOTE - NSTXDEPRESINTERMD_PSY_ALL_CORE
Depression improved today and pt appears brighter. Pt also making more of an effort to engage in therapeutic activities on the unit.   1. S/p Ivega Sustenna 117mg on 11/16/20. Given akathisia and pt preference will skip next dose and d/c. Treat akathisia for the time being with Propranolol 10mg BID and Klonopin 0.5mg BID (which will also address anxiety). 2. C/w Depakote ER 1000mg qhs (will level 12/10/20) and Lamictal 150mg (at half-home dose due to P450 interaction) 3. C/w Seroquel 300mg qhs for bipolar depression (recently started)

## 2020-12-08 NOTE — BH PATIENT PROFILE - STATED REASON FOR ADMISSION
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 As per [facility] report, pt reports increased depression and anxiety for past month, with SI with thoughts of jumping in front of a train or drowning himself with ambivalent intent, pt denies acute stressors, reports feeling restless, pacing and is somatically preoccupied with poor appetite and poor sleep, difficulty focusing, low energy, anhedonia and hopelessness. Pt has hx multiple prior psychiatric hospitalizations (most recent summer 2020), receiving tx at [facility], no hx SIB or SA, no hx violence or legal issues, no hx substance abuse, PMHx DM II, pt tested COVID negative 12/2, no known allergies. Pt&apos;s legal status 9.13.Upon admission to the unit, as per collateral nursing report, pt A&Ox3, VS and belongings checked and skin assessment completed as per policy. Pt endorses current passive SI, denies current intent or plan, pt agrees to seek staff support and report worsening S/I/I/P and SIB, unit RN to continue to monitor and maintain safety. Pt denies current or any hx A/V/H, H/I, pt denies any current pain or discomfort. Pt maintained on universal fall risk precautions and routine Q 15 minute checks. Pt introduced to staff, oriented to the unit and unit routine. Reassurance and mental support provided, safety and comfort ensured. A safe and therapeutic environment is maintained. Monitoring continues.

## 2020-12-08 NOTE — BH INPATIENT PSYCHIATRY PROGRESS NOTE - MSE UNSTRUCTURED FT
Pt is a 54yo man with fair grooming and hygiene (improved from yesterday). Pt calm and cooperative with good eye contact. Oddly-related. +Akathisia (though appears improved). Speech is normal in rate and volume, spontaneous. Stated mood is "better." Affect is depressed and anxious, though somewhat brighter and more reactive. TP is linear. TC: no evidence of delusions or other psychotic process, denies SI/I/P and HI/I/P. No perceptual disturbances noted. Insight and judgement are fair. Impulse control intact.

## 2020-12-08 NOTE — BH PSYCHOLOGY - GROUP THERAPY NOTE - NSBHPSYCHOLPARTICIPCOMMENT_PSY_A_CORE FT
Patient participated in session. Patient participated by reading from the worksheet. Patient was observed to be attentive but restless throughout session.

## 2020-12-09 LAB
GLUCOSE BLDC GLUCOMTR-MCNC: 100 MG/DL — HIGH (ref 70–99)
GLUCOSE BLDC GLUCOMTR-MCNC: 125 MG/DL — HIGH (ref 70–99)
GLUCOSE BLDC GLUCOMTR-MCNC: 140 MG/DL — HIGH (ref 70–99)
GLUCOSE BLDC GLUCOMTR-MCNC: 152 MG/DL — HIGH (ref 70–99)

## 2020-12-09 PROCEDURE — 99232 SBSQ HOSP IP/OBS MODERATE 35: CPT

## 2020-12-09 RX ADMIN — MAGNESIUM HYDROXIDE 30 MILLILITER(S): 400 TABLET, CHEWABLE ORAL at 10:18

## 2020-12-09 RX ADMIN — QUETIAPINE FUMARATE 300 MILLIGRAM(S): 200 TABLET, FILM COATED ORAL at 20:45

## 2020-12-09 RX ADMIN — Medication 0.5 MILLIGRAM(S): at 20:45

## 2020-12-09 RX ADMIN — DIVALPROEX SODIUM 1000 MILLIGRAM(S): 500 TABLET, DELAYED RELEASE ORAL at 20:44

## 2020-12-09 RX ADMIN — POLYETHYLENE GLYCOL 3350 17 GRAM(S): 17 POWDER, FOR SOLUTION ORAL at 20:48

## 2020-12-09 RX ADMIN — LAMOTRIGINE 150 MILLIGRAM(S): 25 TABLET, ORALLY DISINTEGRATING ORAL at 20:45

## 2020-12-09 RX ADMIN — METFORMIN HYDROCHLORIDE 500 MILLIGRAM(S): 850 TABLET ORAL at 08:23

## 2020-12-09 RX ADMIN — SENNA PLUS 2 TABLET(S): 8.6 TABLET ORAL at 20:44

## 2020-12-09 RX ADMIN — METFORMIN HYDROCHLORIDE 500 MILLIGRAM(S): 850 TABLET ORAL at 20:44

## 2020-12-09 RX ADMIN — Medication 0.5 MILLIGRAM(S): at 08:23

## 2020-12-09 NOTE — BH INPATIENT PSYCHIATRY PROGRESS NOTE - NSBHFUPINTERVALHXFT_PSY_A_CORE
No acute events overnight. Pt compliant with meds and slept well. Reports continued in improvement in mood though with ongoing anxiety. Denies SI/I/P. Feeling more hopeful. Remains mostly isolative to bedroom but attending some groups. Ongoing akathisia noted, though appears somewhat improved. Appetite better and pt eating all meals. Remains bothered by constipation.

## 2020-12-09 NOTE — BH INPATIENT PSYCHIATRY PROGRESS NOTE - NSTXDEPRESINTERMD_PSY_ALL_CORE
Depression improved today and pt appears brighter. Pt also making more of an effort to engage in therapeutic activities on the unit.   1. S/p Ivega Sustenna 117mg on 11/16/20. Given akathisia and pt preference will skip next dose and d/c. Treat akathisia for the time being with Propranolol 10mg BID and Klonopin 0.5mg BID (which will also address anxiety). 2. C/w Depakote ER 1000mg qhs (will level 12/10/20) and Lamictal 150mg (at half-home dose due to P450 interaction) 3. C/w Seroquel 300mg qhs for bipolar depression

## 2020-12-09 NOTE — BH INPATIENT PSYCHIATRY PROGRESS NOTE - MSE UNSTRUCTURED FT
Pt is a 56yo man with fair grooming and hygiene (improved). Pt calm and cooperative with good eye contact. Oddly-related. +Akathisia (though appears improved). Speech is normal in rate and volume, spontaneous. Stated mood is "better." Affect is anxious, though somewhat brighter and more reactive. TP is linear. TC: no evidence of delusions or other psychotic process, denies SI/I/P and HI/I/P. No perceptual disturbances noted. Insight and judgement are fair. Impulse control intact.

## 2020-12-10 LAB
GLUCOSE BLDC GLUCOMTR-MCNC: 105 MG/DL — HIGH (ref 70–99)
GLUCOSE BLDC GLUCOMTR-MCNC: 111 MG/DL — HIGH (ref 70–99)
GLUCOSE BLDC GLUCOMTR-MCNC: 143 MG/DL — HIGH (ref 70–99)
GLUCOSE BLDC GLUCOMTR-MCNC: 157 MG/DL — HIGH (ref 70–99)

## 2020-12-10 PROCEDURE — 99232 SBSQ HOSP IP/OBS MODERATE 35: CPT

## 2020-12-10 RX ORDER — MULTIVIT WITH MIN/MFOLATE/K2 340-15/3 G
1 POWDER (GRAM) ORAL ONCE
Refills: 0 | Status: COMPLETED | OUTPATIENT
Start: 2020-12-10 | End: 2020-12-10

## 2020-12-10 RX ORDER — LACTULOSE 10 G/15ML
10 SOLUTION ORAL ONCE
Refills: 0 | Status: COMPLETED | OUTPATIENT
Start: 2020-12-10 | End: 2020-12-10

## 2020-12-10 RX ORDER — SODIUM POLYSTYRENE SULFONATE 4.1 MEQ/G
30 POWDER, FOR SUSPENSION ORAL ONCE
Refills: 0 | Status: COMPLETED | OUTPATIENT
Start: 2020-12-10 | End: 2020-12-10

## 2020-12-10 RX ADMIN — Medication 1: at 08:00

## 2020-12-10 RX ADMIN — QUETIAPINE FUMARATE 300 MILLIGRAM(S): 200 TABLET, FILM COATED ORAL at 20:19

## 2020-12-10 RX ADMIN — METFORMIN HYDROCHLORIDE 500 MILLIGRAM(S): 850 TABLET ORAL at 20:18

## 2020-12-10 RX ADMIN — POLYETHYLENE GLYCOL 3350 17 GRAM(S): 17 POWDER, FOR SOLUTION ORAL at 08:48

## 2020-12-10 RX ADMIN — POLYETHYLENE GLYCOL 3350 17 GRAM(S): 17 POWDER, FOR SOLUTION ORAL at 20:26

## 2020-12-10 RX ADMIN — METFORMIN HYDROCHLORIDE 500 MILLIGRAM(S): 850 TABLET ORAL at 08:49

## 2020-12-10 RX ADMIN — SENNA PLUS 2 TABLET(S): 8.6 TABLET ORAL at 20:19

## 2020-12-10 RX ADMIN — Medication 0.5 MILLIGRAM(S): at 20:18

## 2020-12-10 RX ADMIN — Medication 1 BOTTLE: at 18:47

## 2020-12-10 RX ADMIN — LACTULOSE 10 GRAM(S): 10 SOLUTION ORAL at 12:13

## 2020-12-10 RX ADMIN — LAMOTRIGINE 150 MILLIGRAM(S): 25 TABLET, ORALLY DISINTEGRATING ORAL at 20:19

## 2020-12-10 RX ADMIN — DIVALPROEX SODIUM 1000 MILLIGRAM(S): 500 TABLET, DELAYED RELEASE ORAL at 20:19

## 2020-12-10 RX ADMIN — Medication 0.5 MILLIGRAM(S): at 08:48

## 2020-12-10 NOTE — BH TREATMENT PLAN - NSTXDEPRESINTERRN_PSY_ALL_CORE
encouraged the patient to participate in groups during the day. Emotional support provided, patient assessed for signs of depression

## 2020-12-10 NOTE — BH PSYCHOLOGY - GROUP THERAPY NOTE - NSBHPSYCHOLPARTICIPCOMMENT_PSY_A_CORE FT
Patient participated actively in group. Patient participated by reading from the worksheet, asking questions, responding to worksheet material, and responding to other group members. Patient was observed to be restless during group.

## 2020-12-10 NOTE — BH INPATIENT PSYCHIATRY PROGRESS NOTE - NSBHFUPINTERVALHXFT_PSY_A_CORE
No acute events overnight. Pt compliant with meds and slept well. Reports ongoing improved mood and denies SI/I/P. No acute events overnight. Pt compliant with meds and slept well. Reports ongoing improved mood and denies SI/I/P. Pt with ongoing akathisia, shifting from leg to leg during interview, though reports some improvement and better ability to control it. Appetite and energy are good. Pt attending more groups during the day and less isolative. Remains concerned about ongoing constipation. Sleeping well.

## 2020-12-10 NOTE — BH PSYCHOLOGY - GROUP THERAPY NOTE - NSBHPSYCHOLPARTICIPCOMMENT_PSY_A_CORE FT
Patient appeared attentive in group discussion. He read out loud when prompted. He came up to  and expressed none of the activities seem enjoyable to him due to his depression.  validated his feelings and normalized lack of interest as a common symptom of depression.  explained that it is important to start with the smallest increment of an activity. For example, pt stated he used to enjoy taking care of his dogs but has stopped walking them since feeling depressed. Leader suggested this activity can be scaled down as small as just petting the dogs. Pt was receptive and agreed to receive a more detailed worksheet on behavioral activation.

## 2020-12-10 NOTE — BH TREATMENT PLAN - NSTXCOPEINTERRN_PSY_ALL_CORE
Continue to assist in identifying stregnths and positive coping skills.  Encouraged participation in psycho educational groups and and activities

## 2020-12-10 NOTE — BH TREATMENT PLAN - NSTXANXINTERSW_PSY_ALL_CORE
Unit SW provided insight, support, and psycho-education while maintaining contact with identified supports.

## 2020-12-10 NOTE — BH INPATIENT PSYCHIATRY PROGRESS NOTE - NSTXDEPRESINTERMD_PSY_ALL_CORE
Depression improved today and pt appears brighter. Pt also making more of an effort to engage in therapeutic activities on the unit.   1. S/p Ivega Sustenna 117mg on 11/16/20. Given akathisia and pt preference will skip next dose and d/c. Treat akathisia for the time being with Propranolol 10mg BID and Klonopin 0.5mg BID (which will also address anxiety). 2. C/w Depakote ER 1000mg qhs (will level 12/10/20) and Lamictal 150mg (at half-home dose due to P450 interaction) 3. C/w Seroquel 300mg qhs for bipolar depression Depression continuing to improve and pt making more of an effort to engage in therapeutic activities on the unit.   1. S/p Ivega Sustenna 117mg on 11/16/20. Given akathisia and pt preference will skip next dose and d/c. Treat akathisia for the time being with Propranolol 10mg BID and Klonopin 0.5mg BID (which will also address anxiety). 2. C/w Depakote ER 1000mg qhs (will get level tomorrow 12/10/20) and Lamictal 150mg (at half-home dose due to P450 interaction) 3. C/w Seroquel 300mg qhs for bipolar depression

## 2020-12-10 NOTE — BH INPATIENT PSYCHIATRY PROGRESS NOTE - MSE UNSTRUCTURED FT
Pt is a 54yo man with fair grooming and hygiene (improved). Pt calm and cooperative with good eye contact. Oddly-related. +Akathisia (though appears improved). Speech is normal in rate and volume, spontaneous. Stated mood is "better." Affect is anxious, though somewhat brighter and more reactive. TP is linear. TC: no evidence of delusions or other psychotic process, denies SI/I/P and HI/I/P. No perceptual disturbances noted. Insight and judgement are fair. Impulse control intact. Pt is a 54yo man with fair grooming and hygiene (improved). Pt calm and cooperative with good eye contact. +Akathisia (though appears improved). Speech is normal in rate and volume, spontaneous. Stated mood is "better." Affect is anxious, though somewhat brighter and more reactive. TP is linear. TC: no evidence of delusions or other psychotic process, denies SI/I/P and HI/I/P. No perceptual disturbances noted. Insight and judgement are fair. Impulse control intact.

## 2020-12-10 NOTE — BH TREATMENT PLAN - NSCMSPTSTRENGTHS_PSY_ALL_CORE
Supportive family/Intact employment/Compliance to treatment/Expressive of emotions/Future/goal oriented/Intact family/Physically healthy/Able to adapt/Financial stability/Tolerant

## 2020-12-10 NOTE — BH TREATMENT PLAN - NSTXCOPEINTERPR_PSY_ALL_CORE
Pt would benefit from demonstrating daily medication treatment complaince as well as identifying 1-2 healthy coping skills to help manage symptoms by day 7.

## 2020-12-10 NOTE — BH TREATMENT PLAN - NSTXPLANTHERAPYSESSIONSFT_PSY_ALL_CORE
12-10-20  Type of therapy: Cognitive behavior therapy,Coping skills  Type of session: Individual  Level of patient participation: Engaged  Duration of participation: 15 minutes  Therapy conducted by: Psych rehab  Therapy Summary: Pt has made some progress towards psychiatric goal this week. Pt has demonstrated daily medication and treatment compliance with good effect. Pt has demonstrated normal sleeping and eating patterns. Pts mood has overall improved although continues to experience anxiety. Pt is denying experiencing any SI/HI. Pt is overall more hopeful. Pt is isolative on the unit, keeping mostly to himself and not interacting with other staff or peers. Pt has attended a few groups during the course of the week and has been appropriately engaged/participated. Pt ADLs are fairly maintained on an independent basis.

## 2020-12-10 NOTE — BH TREATMENT PLAN - NSDCCRITERIA_PSY_ALL_CORE
Pt able to demonstrate sustained improvement in mood and sustained resolution of suicidality. Pt able to demonstrate ability to take care of himself and appropriately tend to ADLs.

## 2020-12-10 NOTE — BH TREATMENT PLAN - NSTXDEPRESINTERMD_PSY_ALL_CORE
Depression continuing to improve and pt making more of an effort to engage in therapeutic activities on the unit.   1. S/p Ivega Sustenna 117mg on 11/16/20. Given akathisia and pt preference will skip next dose and d/c. Treat akathisia for the time being with Propranolol 10mg BID and Klonopin 0.5mg BID (which will also address anxiety). 2. C/w Depakote ER 1000mg qhs (will get level tomorrow 12/10/20) and Lamictal 150mg (at half-home dose due to P450 interaction) 3. C/w Seroquel 300mg qhs for bipolar depression

## 2020-12-10 NOTE — BH TREATMENT PLAN - NSTXCAREGIVERPARTICIPATE_PSY_P_CORE
Family/Caregiver participated in identification of needs/problems/goals for treatment/Family/Caregiver participated in development of after care plan/Family/Caregiver participated in defining interventions

## 2020-12-11 LAB
ALBUMIN SERPL ELPH-MCNC: 4.5 G/DL — SIGNIFICANT CHANGE UP (ref 3.3–5)
ALP SERPL-CCNC: 60 U/L — SIGNIFICANT CHANGE UP (ref 40–120)
ALT FLD-CCNC: 26 U/L — SIGNIFICANT CHANGE UP (ref 4–41)
ANION GAP SERPL CALC-SCNC: 10 MMOL/L — SIGNIFICANT CHANGE UP (ref 7–14)
AST SERPL-CCNC: 13 U/L — SIGNIFICANT CHANGE UP (ref 4–40)
BILIRUB SERPL-MCNC: 0.4 MG/DL — SIGNIFICANT CHANGE UP (ref 0.2–1.2)
BUN SERPL-MCNC: 15 MG/DL — SIGNIFICANT CHANGE UP (ref 7–23)
CALCIUM SERPL-MCNC: 9.7 MG/DL — SIGNIFICANT CHANGE UP (ref 8.4–10.5)
CHLORIDE SERPL-SCNC: 102 MMOL/L — SIGNIFICANT CHANGE UP (ref 98–107)
CO2 SERPL-SCNC: 28 MMOL/L — SIGNIFICANT CHANGE UP (ref 22–31)
CREAT SERPL-MCNC: 1.01 MG/DL — SIGNIFICANT CHANGE UP (ref 0.5–1.3)
GLUCOSE BLDC GLUCOMTR-MCNC: 136 MG/DL — HIGH (ref 70–99)
GLUCOSE BLDC GLUCOMTR-MCNC: 137 MG/DL — HIGH (ref 70–99)
GLUCOSE SERPL-MCNC: 139 MG/DL — HIGH (ref 70–99)
HCT VFR BLD CALC: 45.4 % — SIGNIFICANT CHANGE UP (ref 39–50)
HGB BLD-MCNC: 15.1 G/DL — SIGNIFICANT CHANGE UP (ref 13–17)
MCHC RBC-ENTMCNC: 28.2 PG — SIGNIFICANT CHANGE UP (ref 27–34)
MCHC RBC-ENTMCNC: 33.3 GM/DL — SIGNIFICANT CHANGE UP (ref 32–36)
MCV RBC AUTO: 84.9 FL — SIGNIFICANT CHANGE UP (ref 80–100)
NRBC # BLD: 0 /100 WBCS — SIGNIFICANT CHANGE UP
NRBC # FLD: 0 K/UL — SIGNIFICANT CHANGE UP
PLATELET # BLD AUTO: 137 K/UL — LOW (ref 150–400)
POTASSIUM SERPL-MCNC: 4 MMOL/L — SIGNIFICANT CHANGE UP (ref 3.5–5.3)
POTASSIUM SERPL-SCNC: 4 MMOL/L — SIGNIFICANT CHANGE UP (ref 3.5–5.3)
PROT SERPL-MCNC: 7.2 G/DL — SIGNIFICANT CHANGE UP (ref 6–8.3)
RBC # BLD: 5.35 M/UL — SIGNIFICANT CHANGE UP (ref 4.2–5.8)
RBC # FLD: 13.6 % — SIGNIFICANT CHANGE UP (ref 10.3–14.5)
SODIUM SERPL-SCNC: 140 MMOL/L — SIGNIFICANT CHANGE UP (ref 135–145)
VALPROATE SERPL-MCNC: 89.8 UG/ML — SIGNIFICANT CHANGE UP (ref 50–100)
WBC # BLD: 4.65 K/UL — SIGNIFICANT CHANGE UP (ref 3.8–10.5)
WBC # FLD AUTO: 4.65 K/UL — SIGNIFICANT CHANGE UP (ref 3.8–10.5)

## 2020-12-11 PROCEDURE — 99232 SBSQ HOSP IP/OBS MODERATE 35: CPT

## 2020-12-11 RX ADMIN — Medication 0.5 MILLIGRAM(S): at 21:39

## 2020-12-11 RX ADMIN — POLYETHYLENE GLYCOL 3350 17 GRAM(S): 17 POWDER, FOR SOLUTION ORAL at 21:37

## 2020-12-11 RX ADMIN — METFORMIN HYDROCHLORIDE 500 MILLIGRAM(S): 850 TABLET ORAL at 08:54

## 2020-12-11 RX ADMIN — LAMOTRIGINE 150 MILLIGRAM(S): 25 TABLET, ORALLY DISINTEGRATING ORAL at 21:39

## 2020-12-11 RX ADMIN — METFORMIN HYDROCHLORIDE 500 MILLIGRAM(S): 850 TABLET ORAL at 21:39

## 2020-12-11 RX ADMIN — QUETIAPINE FUMARATE 300 MILLIGRAM(S): 200 TABLET, FILM COATED ORAL at 21:39

## 2020-12-11 RX ADMIN — DIVALPROEX SODIUM 1000 MILLIGRAM(S): 500 TABLET, DELAYED RELEASE ORAL at 21:39

## 2020-12-11 RX ADMIN — Medication 1 ENEMA: at 12:41

## 2020-12-11 RX ADMIN — Medication 0.5 MILLIGRAM(S): at 08:54

## 2020-12-11 RX ADMIN — SENNA PLUS 2 TABLET(S): 8.6 TABLET ORAL at 21:38

## 2020-12-11 NOTE — BH INPATIENT PSYCHIATRY PROGRESS NOTE - NSTXDEPRESINTERMD_PSY_ALL_CORE
Depression continuing to improve and pt making more of an effort to engage in therapeutic activities on the unit.   1. S/p Ivega Sustenna 117mg on 11/16/20. Given akathisia and pt preference will skip next dose and d/c. Treat akathisia for the time being with Propranolol 10mg BID and Klonopin 0.5mg BID (which will also address anxiety). 2. C/w Depakote ER 1000mg qhs (will get level tomorrow 12/10/20) and Lamictal 150mg (at half-home dose due to P450 interaction) 3. C/w Seroquel 300mg qhs for bipolar depression Depression continuing to improve and pt making more of an effort to engage in therapeutic activities on the unit.   1. S/p Ivega Sustenna 117mg on 11/16/20. Given akathisia and pt preference will skip next dose and d/c. Treat akathisia for the time being with Propranolol 10mg BID and Klonopin 0.5mg BID (which will also address anxiety). 2. C/w Depakote ER 1000mg qhs. Level today therapeutic at 90. LFTs within normal limits though platelets downtrending. Will get repeat CBC over the weekend and trend. 3. C/w Lamictal 150mg (at half-home dose due to P450 interaction with Depakote) 4. C/w Seroquel 300mg qhs for bipolar depression 5. Constipation: c/w senna and Miralax. s/p fleet enema. pt encouraged to hydrate and inc physical activity on the unit. will monitor

## 2020-12-11 NOTE — BH INPATIENT PSYCHIATRY PROGRESS NOTE - MSE UNSTRUCTURED FT
Pt is a 56yo man with fair grooming and hygiene (improved). Pt calm and cooperative with good eye contact. +Akathisia (though appears increasingly improved). Speech is normal in rate and volume, spontaneous. Stated mood is "better." Affect is anxious, though brighter and more reactive. TP is linear. TC: no evidence of delusions or other psychotic process, denies SI/I/P and HI/I/P. No perceptual disturbances noted. Insight and judgement are fair. Impulse control intact.

## 2020-12-11 NOTE — BH INPATIENT PSYCHIATRY PROGRESS NOTE - NSBHFUPINTERVALHXFT_PSY_A_CORE
Pt reports ongoing improved mood though mildly depressed. Denies SI/I/P. Sleeping well and eating all meals. Attending groups during the day and more engaged in the unit milieu. Tending to ADLs independently and appropriately. Remains in touch with family, who are supportive. Remains concerned about ongoing constipation. Reports improved restlessness. Pt reports ongoing improved mood though mildly depressed. Denies SI/I/P. Sleeping well and eating all meals. Attending groups during the day and more engaged in the unit milieu. Tending to ADLs independently and appropriately. Remains in touch with family, who are supportive. Reports improved restlessness.      Remains concerned about ongoing constipation. Denies abd pain and reports +flatus. Reports having some very small and incomplete bowel movements during current hospitalization.

## 2020-12-12 LAB
ALBUMIN SERPL ELPH-MCNC: 4.1 G/DL — SIGNIFICANT CHANGE UP (ref 3.3–5)
ALP SERPL-CCNC: 53 U/L — SIGNIFICANT CHANGE UP (ref 40–120)
ALT FLD-CCNC: 22 U/L — SIGNIFICANT CHANGE UP (ref 4–41)
ANION GAP SERPL CALC-SCNC: 11 MMOL/L — SIGNIFICANT CHANGE UP (ref 7–14)
AST SERPL-CCNC: 12 U/L — SIGNIFICANT CHANGE UP (ref 4–40)
BILIRUB SERPL-MCNC: 0.4 MG/DL — SIGNIFICANT CHANGE UP (ref 0.2–1.2)
BUN SERPL-MCNC: 12 MG/DL — SIGNIFICANT CHANGE UP (ref 7–23)
CALCIUM SERPL-MCNC: 9.6 MG/DL — SIGNIFICANT CHANGE UP (ref 8.4–10.5)
CHLORIDE SERPL-SCNC: 101 MMOL/L — SIGNIFICANT CHANGE UP (ref 98–107)
CO2 SERPL-SCNC: 26 MMOL/L — SIGNIFICANT CHANGE UP (ref 22–31)
CREAT SERPL-MCNC: 0.97 MG/DL — SIGNIFICANT CHANGE UP (ref 0.5–1.3)
GLUCOSE SERPL-MCNC: 120 MG/DL — HIGH (ref 70–99)
POTASSIUM SERPL-MCNC: 4.1 MMOL/L — SIGNIFICANT CHANGE UP (ref 3.5–5.3)
POTASSIUM SERPL-SCNC: 4.1 MMOL/L — SIGNIFICANT CHANGE UP (ref 3.5–5.3)
PROT SERPL-MCNC: 6.4 G/DL — SIGNIFICANT CHANGE UP (ref 6–8.3)
SODIUM SERPL-SCNC: 138 MMOL/L — SIGNIFICANT CHANGE UP (ref 135–145)

## 2020-12-12 RX ADMIN — METFORMIN HYDROCHLORIDE 500 MILLIGRAM(S): 850 TABLET ORAL at 20:48

## 2020-12-12 RX ADMIN — Medication 0.5 MILLIGRAM(S): at 20:49

## 2020-12-12 RX ADMIN — LAMOTRIGINE 150 MILLIGRAM(S): 25 TABLET, ORALLY DISINTEGRATING ORAL at 20:49

## 2020-12-12 RX ADMIN — METFORMIN HYDROCHLORIDE 500 MILLIGRAM(S): 850 TABLET ORAL at 08:56

## 2020-12-12 RX ADMIN — DIVALPROEX SODIUM 1000 MILLIGRAM(S): 500 TABLET, DELAYED RELEASE ORAL at 20:48

## 2020-12-12 RX ADMIN — Medication 0.5 MILLIGRAM(S): at 09:10

## 2020-12-12 RX ADMIN — QUETIAPINE FUMARATE 300 MILLIGRAM(S): 200 TABLET, FILM COATED ORAL at 20:49

## 2020-12-12 RX ADMIN — POLYETHYLENE GLYCOL 3350 17 GRAM(S): 17 POWDER, FOR SOLUTION ORAL at 23:09

## 2020-12-12 RX ADMIN — SENNA PLUS 2 TABLET(S): 8.6 TABLET ORAL at 20:48

## 2020-12-12 NOTE — BH PSYCHOLOGY - GROUP THERAPY NOTE - NSPSYCHOLGRPGENINT_PSY_A_CORE
supported coping skills/dialectical behavior therapy (dbt)/reality testing/supportive therapy/treatment compliance encouraged/cognitive/behavioral therapy/stress management
cognitive/behavioral therapy/supported coping skills/supportive therapy/problem solving techniques discussed
social skills training/cognitive/behavioral therapy/stress management/dialectical behavior therapy (dbt)/interpersonal therapy (ipt)/supported coping skills/supportive therapy/treatment compliance encouraged/problem solving techniques discussed
stress management/treatment compliance encouraged/cognitive/behavioral therapy/reality testing/supported coping skills/supportive therapy/social skills training/encourage medication compliance/problem solving techniques discussed

## 2020-12-12 NOTE — BH PSYCHOLOGY - GROUP THERAPY NOTE - NSBHPSYCHOLPARTICIPCOMMENT_PSY_A_CORE FT
Patient participated actively in session. Patient participated by reading from the worksheet, asking questions, and providing examples from his personal experience.

## 2020-12-12 NOTE — BH PSYCHOLOGY - GROUP THERAPY NOTE - NSPSYCHOLGRPGENPROB_PSY_A_CORE
anxiety/depression
academic/vocational/social dysfunction/depression
depression/anxiety
academic/vocational/social dysfunction/depression/anxiety

## 2020-12-12 NOTE — BH PSYCHOLOGY - GROUP THERAPY NOTE - NSPSYCHOLGRPGENGOAL_PSY_A_CORE
treatment compliance/decrease symptoms/improve reality testing/prevent relapse/psychoeducation/improve social/vocational/coping skills
improve level of independent functioning/psychoeducation/decrease symptoms/improve social/vocational/coping skills
prevent relapse/decrease symptoms/improve social/vocational/coping skills/abstinence/psychoeducation/treatment compliance
improve level of independent functioning/treatment compliance/decrease symptoms/improve reality testing/psychoeducation/assessment/improve social/vocational/coping skills

## 2020-12-12 NOTE — BH PSYCHOLOGY - GROUP THERAPY NOTE - NSPSYCHOLGRPGENPT_PSY_A_CORE FT
Patient attended cognitive behavioral therapy group session. Group session focused on motivation. Discussion revolved around sources of motivation, ambivalence towards change, and tools for understanding and developing motivation. Group expectations and guidelines, as well as confidentiality and its limits, were addressed. Principles of cognitive behavioral therapy related to today’s topic were reviewed and discussed. Group members illustrated understanding of these concepts by giving personal examples based on their current experiences. Discussion came from the group topic of motivation, difficulties group members have had in developing motivation, and areas that group members would like to create change in their lives.
Patient attended cognitive behavioral therapy group session. Group session focused on assertive communication. Discussion revolved around the definition of assertive communication, the benefits of assertive communication, and nonverbal aspects of assertive communication. Group expectations and guidelines, as well as confidentiality and its limits, were addressed. Principles of cognitive behavioral therapy related to today’s topic were reviewed and discussed. Group members illustrated understanding of these concepts by giving personal examples based on their current experiences. Discussion came from the group topic of assertive communication, difficulties group members have had in communicating too passively or aggressively, and ways of effectively expressing wants and needs
Patient attended the psychology cognitive-behavior therapy group. The discussion was focused on the topic of Behavioral Activation.  Group expectations, guidelines, confidentiality (as well as its limitations) were reviewed. Group learned behavioral activation as a technique to ameliorate symptoms of depression. Group members discussed which activities they find enjoyable. Discussion stemmed from the group's focus on the connection between thoughts, feelings and behaviors.  Group members demonstrated their understanding through active participation, discussion, and by asking clarifying questions.  Members were also provided with a handout describing the concepts and strategies discussed during group.
Patient attended cognitive behavioral therapy group session. Group session focused on the Importance of Engaging in Life. Discussion revolved around how people who experience depression are less likely to engage in pleasurable events, resulting in downwards spirals. Reviewed the importance of engaging in activities that give themselves a sense of mastery and bring marley.  Principles of cognitive behavioral therapy related to today’s topic were reviewed and discussed. Group members illustrated understanding of these concepts by giving personal examples based on their current experiences. Discussion came from group topic including importance of engaging in activities that are pleasurable, varying kinds of pleasant activities (e.g., social interaction, useful/productive activities, and intrinsically pleasant activities), and explored pleasurable activities patients can engaged with while on the inpatient unit. In addition to the group topic, the importance of actively engaging in treatment while in the hospital was discussed.

## 2020-12-13 RX ADMIN — DIVALPROEX SODIUM 1000 MILLIGRAM(S): 500 TABLET, DELAYED RELEASE ORAL at 21:28

## 2020-12-13 RX ADMIN — POLYETHYLENE GLYCOL 3350 17 GRAM(S): 17 POWDER, FOR SOLUTION ORAL at 22:34

## 2020-12-13 RX ADMIN — Medication 0.5 MILLIGRAM(S): at 08:20

## 2020-12-13 RX ADMIN — METFORMIN HYDROCHLORIDE 500 MILLIGRAM(S): 850 TABLET ORAL at 08:20

## 2020-12-13 RX ADMIN — LAMOTRIGINE 150 MILLIGRAM(S): 25 TABLET, ORALLY DISINTEGRATING ORAL at 21:29

## 2020-12-13 RX ADMIN — METFORMIN HYDROCHLORIDE 500 MILLIGRAM(S): 850 TABLET ORAL at 21:27

## 2020-12-13 RX ADMIN — QUETIAPINE FUMARATE 300 MILLIGRAM(S): 200 TABLET, FILM COATED ORAL at 21:26

## 2020-12-13 RX ADMIN — SENNA PLUS 2 TABLET(S): 8.6 TABLET ORAL at 21:29

## 2020-12-13 RX ADMIN — Medication 0.5 MILLIGRAM(S): at 21:29

## 2020-12-13 RX ADMIN — POLYETHYLENE GLYCOL 3350 17 GRAM(S): 17 POWDER, FOR SOLUTION ORAL at 08:20

## 2020-12-13 RX ADMIN — MAGNESIUM HYDROXIDE 30 MILLILITER(S): 400 TABLET, CHEWABLE ORAL at 22:00

## 2020-12-14 VITALS — SYSTOLIC BLOOD PRESSURE: 92 MMHG | HEART RATE: 79 BPM | TEMPERATURE: 97 F | DIASTOLIC BLOOD PRESSURE: 60 MMHG

## 2020-12-14 LAB
HCT VFR BLD CALC: 41.7 % — SIGNIFICANT CHANGE UP (ref 39–50)
HGB BLD-MCNC: 14.1 G/DL — SIGNIFICANT CHANGE UP (ref 13–17)
MCHC RBC-ENTMCNC: 28.3 PG — SIGNIFICANT CHANGE UP (ref 27–34)
MCHC RBC-ENTMCNC: 33.8 GM/DL — SIGNIFICANT CHANGE UP (ref 32–36)
MCV RBC AUTO: 83.6 FL — SIGNIFICANT CHANGE UP (ref 80–100)
NRBC # BLD: 0 /100 WBCS — SIGNIFICANT CHANGE UP
NRBC # FLD: 0 K/UL — SIGNIFICANT CHANGE UP
PLATELET # BLD AUTO: 136 K/UL — LOW (ref 150–400)
RBC # BLD: 4.99 M/UL — SIGNIFICANT CHANGE UP (ref 4.2–5.8)
RBC # FLD: 13.6 % — SIGNIFICANT CHANGE UP (ref 10.3–14.5)
WBC # BLD: 5.33 K/UL — SIGNIFICANT CHANGE UP (ref 3.8–10.5)
WBC # FLD AUTO: 5.33 K/UL — SIGNIFICANT CHANGE UP (ref 3.8–10.5)

## 2020-12-14 PROCEDURE — 99239 HOSP IP/OBS DSCHRG MGMT >30: CPT

## 2020-12-14 RX ORDER — LAMOTRIGINE 25 MG/1
300 TABLET, ORALLY DISINTEGRATING ORAL
Qty: 0 | Refills: 0 | DISCHARGE

## 2020-12-14 RX ORDER — QUETIAPINE FUMARATE 200 MG/1
1 TABLET, FILM COATED ORAL
Qty: 0 | Refills: 0 | DISCHARGE

## 2020-12-14 RX ORDER — POLYETHYLENE GLYCOL 3350 17 G/17G
17 POWDER, FOR SOLUTION ORAL
Qty: 0 | Refills: 0 | DISCHARGE
Start: 2020-12-14

## 2020-12-14 RX ORDER — SENNA PLUS 8.6 MG/1
2 TABLET ORAL
Qty: 0 | Refills: 0 | DISCHARGE
Start: 2020-12-14

## 2020-12-14 RX ORDER — METFORMIN HYDROCHLORIDE 850 MG/1
1 TABLET ORAL
Qty: 0 | Refills: 0 | DISCHARGE

## 2020-12-14 RX ORDER — DIVALPROEX SODIUM 500 MG/1
2 TABLET, DELAYED RELEASE ORAL
Qty: 60 | Refills: 0
Start: 2020-12-14 | End: 2021-01-12

## 2020-12-14 RX ORDER — LAMOTRIGINE 25 MG/1
1 TABLET, ORALLY DISINTEGRATING ORAL
Qty: 30 | Refills: 0
Start: 2020-12-14 | End: 2021-01-12

## 2020-12-14 RX ORDER — PALIPERIDONE 1.5 MG/1
1 TABLET, EXTENDED RELEASE ORAL
Qty: 0 | Refills: 0 | DISCHARGE

## 2020-12-14 RX ORDER — PROPRANOLOL HCL 160 MG
1 CAPSULE, EXTENDED RELEASE 24HR ORAL
Qty: 60 | Refills: 0
Start: 2020-12-14 | End: 2021-01-12

## 2020-12-14 RX ORDER — QUETIAPINE FUMARATE 200 MG/1
1 TABLET, FILM COATED ORAL
Qty: 30 | Refills: 0
Start: 2020-12-14 | End: 2021-01-12

## 2020-12-14 RX ORDER — CLONAZEPAM 1 MG
1 TABLET ORAL
Qty: 60 | Refills: 0
Start: 2020-12-14 | End: 2021-01-12

## 2020-12-14 RX ORDER — MAGNESIUM HYDROXIDE 400 MG/1
30 TABLET, CHEWABLE ORAL
Qty: 0 | Refills: 0 | DISCHARGE
Start: 2020-12-14

## 2020-12-14 RX ORDER — BENZTROPINE MESYLATE 1 MG
1 TABLET ORAL
Qty: 0 | Refills: 0 | DISCHARGE

## 2020-12-14 RX ORDER — METFORMIN HYDROCHLORIDE 850 MG/1
1 TABLET ORAL
Qty: 60 | Refills: 0
Start: 2020-12-14 | End: 2021-01-12

## 2020-12-14 RX ADMIN — Medication 0.5 MILLIGRAM(S): at 08:30

## 2020-12-14 RX ADMIN — METFORMIN HYDROCHLORIDE 500 MILLIGRAM(S): 850 TABLET ORAL at 08:31

## 2020-12-14 NOTE — BH INPATIENT PSYCHIATRY PROGRESS NOTE - NSBHMETABOLICLABS_PSY_ALL_CORE
Labs within last 12 months

## 2020-12-14 NOTE — BH INPATIENT PSYCHIATRY PROGRESS NOTE - NSTXCOPEGOAL_PSY_ALL_CORE
Identify and utilize 2 coping skills that meet their needs

## 2020-12-14 NOTE — BH INPATIENT PSYCHIATRY PROGRESS NOTE - NSICDXBHSECONDARYDX_PSY_ALL_CORE
Type 2 diabetes mellitus   E11.9  

## 2020-12-14 NOTE — BH INPATIENT PSYCHIATRY PROGRESS NOTE - NSTXDEPRESGOAL_PSY_ALL_CORE
Report using a coping skill to overcome sadness and worry in order to socialize with peers daily
Report using a coping skill to overcome sadness and worry in order to socialize with peers daily
Other...
Report using a coping skill to overcome sadness and worry in order to socialize with peers daily
Exhibit improvements in self-grooming, hygiene, sleep and appetite

## 2020-12-14 NOTE — BH INPATIENT PSYCHIATRY PROGRESS NOTE - NSTXDEPRESINTERMD_PSY_ALL_CORE
Depression remains much improved and pt consistently denying SI/I/P. Sleep and appetite are also improved and pt much more engaged in the therapeutic milieu of the unit. He is hopeful and future-oriented, complaint with meds, and in good behavioral control. Family remains supportive. In light of significant symptomatic improvement, pt no longer presents as an acute risk of harm to himself (or others) and no longer requires hospitalization. Will be discharged home today on   Propranolol 10mg BID (to be discontinued as outpt), Klonopin 0.5mg BID, Depakote ER 1000mg qhs (level 90, platelet count 136, to followed/monitored as outpatient), Lamictal 150mg (at half-home dose due to P450 interaction with Depakote), and Seroquel 300mg qhs.

## 2020-12-14 NOTE — BH INPATIENT PSYCHIATRY DISCHARGE NOTE - NSBHMETABOLIC_PSY_ALL_CORE_FT
BMI:   HbA1c: 7.0  Glucose: POCT Blood Glucose.: 136 mg/dL (12-11-20 @ 11:46)    BP: 92/60 (12-14-20 @ 08:54) (92/60 - 97/62)  Lipid Panel: Date/Time: 12-05-20 @ 09:50  Cholesterol, Serum: 137  Direct LDL: 83  HDL Cholesterol, Serum: 41  Total Cholesterol/HDL Ration Measurement: --  Triglycerides, Serum: 179

## 2020-12-14 NOTE — BH DISCHARGE NOTE NURSING/SOCIAL WORK/PSYCH REHAB - PATIENT PORTAL LINK FT
You can access the FollowMyHealth Patient Portal offered by [facility] by registering at the following website: [email or web address]. By joining [facility]’s FollowMyHealth portal, you will also be able to view your health information using other applications (apps) compatible with our system.

## 2020-12-14 NOTE — CHART NOTE - NSCHARTNOTEFT_GEN_A_CORE
Consult received for Patient follows Therapeutic Diet. At this time consult is unwarranted. Pt with A1C of 7%    Gely Dubois, KYLAH/N Pager [phone]

## 2020-12-14 NOTE — BH INPATIENT PSYCHIATRY PROGRESS NOTE - NSTXANXGOAL_PSY_ALL_CORE
Be able to participate in activities despite lingering anxiety/panic
Be able to participate in activities despite lingering anxiety/panic

## 2020-12-14 NOTE — BH INPATIENT PSYCHIATRY PROGRESS NOTE - NSBHCONSBHPROVDETAILS_PSY_A_CORE  FT
Spoke with Dr. Lashon Munson at [facility] [phone]
Spoke with Dr. Lashon Munson at [facility] [phone]
Spoke with Dr. Lashon Munson at [facility] [phone]
Spoke with Dr. Lashon Munson at [facility] [phone]
Spoke with Dr. Lashon Munson at [facility] [phone]

## 2020-12-14 NOTE — BH DISCHARGE NOTE NURSING/SOCIAL WORK/PSYCH REHAB - NSCDUDCCRISIS_PSY_A_CORE
.  [facility]  [address], [place] [address]   [phone]   Hours: Monday through Friday from 10 AM to 4 PM/Critical access hospital [facility]  1 (146) Critical access hospital-WELL ([phone])  Text "WELL" to 42566  Website: www.[employer]/.[facility] 1 (063) 251HOPE (2322) Website: www.safehorizon.org/.  [facility]  1 (705) LIF[facility] (071-3364)/.  [facility]  [address] [place] [phone]   Hours:  Monday through Friday from 9 AM to 3 PM/.National Suicide Prevention Lifeline 2 (709) 947-9786 .  [place] [facility]  [employer] Crisis Line  4 (520) 330-8055, Option 1/.National Suicide Prevention Lifeline 6 (593) 792-1470/Psychiatric hospital [facility]  1 (642) Psychiatric hospital-WELL ([phone])  Text "WELL" to 53137  Website: www.[employer]/.[facility] 1 (152) 335-[facility] (9109) Website: www.[facility]horizon.org/.  [facility]’s [facility]al [facility]  [address] [place] [phone]   Hours:  Monday through Friday from 9 AM to 3 PM/.  [facility]  1 (566) [facility] (115-3150)

## 2020-12-14 NOTE — BH INPATIENT PSYCHIATRY DISCHARGE NOTE - NSBHSUICIDESTATUS_PSY_ALL_CORE
Acute risk factors include recent episode depression with SI (and several vague suicidal plans), now treated with inpatient hospitalization. Other protective factors that mitigate acute risk include no hx of SAs, during hospitalization pt has consistently denied SI/I/P, he is now hopeful and future-oriented, he is compliant with meds and motivated to further engage in outpt treatment, he has supportive family, he does not have access to a gun, and he is able to engage in safety planning. Pt agreeable to contact outpt providers, call 911, or go to the nearest ED with any imminent safety concerns for self or others.

## 2020-12-14 NOTE — BH INPATIENT PSYCHIATRY PROGRESS NOTE - MSE UNSTRUCTURED FT
Pt is a 56yo man with good grooming and hygiene (improved). Pt calm and cooperative with good eye contact. +Akathisia (though appears improved). Speech is normal in rate and volume, spontaneous. Stated mood is "better." Affect is anxious, though brighter and more reactive. TP is linear. TC: no evidence of delusions or other psychotic process, denies SI/I/P and HI/I/P. No perceptual disturbances noted. Insight and judgement are fair. Impulse control intact.

## 2020-12-14 NOTE — BH INPATIENT PSYCHIATRY PROGRESS NOTE - CURRENT MEDICATION
MEDICATIONS  (STANDING):  clonazePAM  Tablet 0.5 milliGRAM(s) Oral two times a day  dextrose 40% Gel 15 Gram(s) Oral once  diVALproex ER 1000 milliGRAM(s) Oral at bedtime  glucagon  Injectable 1 milliGRAM(s) IntraMuscular once  insulin lispro (ADMELOG) corrective regimen sliding scale   SubCutaneous three times a day before meals  insulin lispro (ADMELOG) corrective regimen sliding scale   SubCutaneous at bedtime  lamoTRIgine 150 milliGRAM(s) Oral at bedtime  metFORMIN 500 milliGRAM(s) Oral two times a day  polyethylene glycol 3350 17 Gram(s) Oral two times a day  propranolol 10 milliGRAM(s) Oral two times a day  QUEtiapine 300 milliGRAM(s) Oral at bedtime  senna 2 Tablet(s) Oral at bedtime    MEDICATIONS  (PRN):  LORazepam     Tablet 2 milliGRAM(s) Oral every 6 hours PRN Agitation  LORazepam   Injectable 2 milliGRAM(s) IntraMuscular once PRN severe agitation  magnesium hydroxide Suspension 30 milliLiter(s) Oral daily PRN Constipation  
MEDICATIONS  (STANDING):  clonazePAM  Tablet 0.5 milliGRAM(s) Oral two times a day  diVALproex ER 1000 milliGRAM(s) Oral at bedtime  lamoTRIgine 150 milliGRAM(s) Oral at bedtime  metFORMIN 500 milliGRAM(s) Oral two times a day  polyethylene glycol 3350 17 Gram(s) Oral two times a day  propranolol 10 milliGRAM(s) Oral two times a day  QUEtiapine 300 milliGRAM(s) Oral at bedtime  senna 2 Tablet(s) Oral at bedtime    MEDICATIONS  (PRN):  LORazepam     Tablet 2 milliGRAM(s) Oral every 6 hours PRN Agitation  LORazepam   Injectable 2 milliGRAM(s) IntraMuscular once PRN severe agitation  magnesium hydroxide Suspension 30 milliLiter(s) Oral daily PRN Constipation  
MEDICATIONS  (STANDING):  clonazePAM  Tablet 0.5 milliGRAM(s) Oral two times a day  diVALproex ER 1000 milliGRAM(s) Oral at bedtime  lamoTRIgine 150 milliGRAM(s) Oral at bedtime  metFORMIN 500 milliGRAM(s) Oral two times a day  polyethylene glycol 3350 17 Gram(s) Oral two times a day  propranolol 10 milliGRAM(s) Oral two times a day  QUEtiapine 300 milliGRAM(s) Oral at bedtime  senna 2 Tablet(s) Oral at bedtime    MEDICATIONS  (PRN):  LORazepam     Tablet 2 milliGRAM(s) Oral every 6 hours PRN Agitation  LORazepam   Injectable 2 milliGRAM(s) IntraMuscular once PRN severe agitation  magnesium hydroxide Suspension 30 milliLiter(s) Oral daily PRN Constipation

## 2020-12-14 NOTE — BH INPATIENT PSYCHIATRY PROGRESS NOTE - NSTREATMENTCERTY_PSY_ALL_CORE

## 2020-12-14 NOTE — BH INPATIENT PSYCHIATRY PROGRESS NOTE - NSBHATTENDATTEST_PSY_ALL_CORE
I have personally seen, examined and participated in the care of this patient. I have reviewed all pertinent clinical information, including history, physical exam, plan and the Medical/PA/NP Student’s note and agree except as noted.

## 2020-12-14 NOTE — BH INPATIENT PSYCHIATRY PROGRESS NOTE - NSBHCHARTREVIEWVS_PSY_A_CORE FT
Vital Signs Last 24 Hrs  T(C): 36.3 (11 Dec 2020 08:53), Max: 36.3 (10 Dec 2020 20:02)  T(F): 97.4 (11 Dec 2020 08:53), Max: 97.4 (11 Dec 2020 08:53)  HR: --  BP: --  BP(mean): --  RR: --  SpO2: --
Vital Signs Last 24 Hrs  T(C): 36.4 (09 Dec 2020 13:17), Max: 36.4 (09 Dec 2020 13:17)  T(F): 97.5 (09 Dec 2020 13:17), Max: 97.5 (09 Dec 2020 13:17)  HR: 95 (09 Dec 2020 13:17) (95 - 95)  BP: 114/84 (09 Dec 2020 13:17) (114/84 - 114/84)  BP(mean): --  RR: --  SpO2: --
Vital Signs Last 24 Hrs  T(C): 36.5 (10 Dec 2020 09:09), Max: 36.8 (09 Dec 2020 19:15)  T(F): 97.7 (10 Dec 2020 09:09), Max: 98.2 (09 Dec 2020 19:15)  HR: 95 (09 Dec 2020 13:17) (95 - 95)  BP: 114/84 (09 Dec 2020 13:17) (114/84 - 114/84)  BP(mean): --  RR: --  SpO2: --
Vital Signs Last 24 Hrs  T(C): 36.3 (14 Dec 2020 08:54), Max: 36.3 (13 Dec 2020 19:48)  T(F): 97.4 (14 Dec 2020 08:54), Max: 97.4 (14 Dec 2020 08:54)  HR: 79 (14 Dec 2020 08:54) (79 - 79)  BP: 92/60 (14 Dec 2020 08:54) (92/60 - 92/60)  BP(mean): --  RR: --  SpO2: --
Vital Signs Last 24 Hrs  T(C): 36.5 (08 Dec 2020 08:05), Max: 36.5 (08 Dec 2020 08:05)  T(F): 97.7 (08 Dec 2020 08:05), Max: 97.7 (08 Dec 2020 08:05)  HR: 97 (08 Dec 2020 08:05) (97 - 97)  BP: 96/79 (08 Dec 2020 08:05) (96/79 - 96/79)  BP(mean): --  RR: --  SpO2: --

## 2020-12-14 NOTE — BH DISCHARGE NOTE NURSING/SOCIAL WORK/PSYCH REHAB - NSBHDCAGENCY1FT_PSY_A_CORE
[facility]  This appt is with Ravindra      *Please note this is a telehealth appointment, do not go into the clinic in person.  You will be contacted by phone and/or email. The Patient Access Department will call the day before to complete your registration.

## 2020-12-14 NOTE — BH INPATIENT PSYCHIATRY PROGRESS NOTE - NSBHMETABOLIC_PSY_ALL_CORE_FT
BMI:   HbA1c:   Glucose: POCT Blood Glucose.: 111 mg/dL (12-10-20 @ 11:29)    BP: 114/84 (12-09-20 @ 13:17) (96/79 - 114/84)  Lipid Panel: Date/Time: 12-05-20 @ 09:50  Cholesterol, Serum: 137  Direct LDL: 83  HDL Cholesterol, Serum: 41  Total Cholesterol/HDL Ration Measurement: --  Triglycerides, Serum: 179  
BMI:   HbA1c:   Glucose: POCT Blood Glucose.: 100 mg/dL (12-09-20 @ 11:52)    BP: 114/84 (12-09-20 @ 13:17) (96/79 - 114/84)  Lipid Panel: Date/Time: 12-05-20 @ 09:50  Cholesterol, Serum: 137  Direct LDL: 83  HDL Cholesterol, Serum: 41  Total Cholesterol/HDL Ration Measurement: --  Triglycerides, Serum: 179  
BMI:   HbA1c:   Glucose: POCT Blood Glucose.: 136 mg/dL (12-11-20 @ 11:46)    BP: 114/84 (12-09-20 @ 13:17) (114/84 - 114/84)  Lipid Panel: Date/Time: 12-05-20 @ 09:50  Cholesterol, Serum: 137  Direct LDL: 83  HDL Cholesterol, Serum: 41  Total Cholesterol/HDL Ration Measurement: --  Triglycerides, Serum: 179  
BMI:   HbA1c:   Glucose: POCT Blood Glucose.: 136 mg/dL (12-11-20 @ 11:46)    BP: 92/60 (12-14-20 @ 08:54) (92/60 - 97/62)  Lipid Panel: Date/Time: 12-05-20 @ 09:50  Cholesterol, Serum: 137  Direct LDL: 83  HDL Cholesterol, Serum: 41  Total Cholesterol/HDL Ration Measurement: --  Triglycerides, Serum: 179  
BMI:   HbA1c: 7.0  Glucose: POCT Blood Glucose.: 134 mg/dL (12-08-20 @ 07:36)    BP: 96/79 (12-08-20 @ 08:05) (96/79 - 110/70)  Lipid Panel: Date/Time: 12-05-20 @ 09:50  Cholesterol, Serum: 137  Direct LDL: 83  HDL Cholesterol, Serum: 41  Total Cholesterol/HDL Ration Measurement: --  Triglycerides, Serum: 179

## 2020-12-14 NOTE — BH INPATIENT PSYCHIATRY PROGRESS NOTE - NSBHCONSULTIPREASON_PSY_A_CORE
danger to self; mental illness expected to respond to inpatient care

## 2020-12-14 NOTE — BH DISCHARGE NOTE NURSING/SOCIAL WORK/PSYCH REHAB - NSDCNEXTLEVELSWNAME_PSY_ALL_CORE_FT
Obtainable through the EMR. Closure 2 Information: This tab is for additional flaps and grafts, including complex repair and grafts and complex repair and flaps. You can also specify a different location for the additional defect, if the location is the same you do not need to select a new one. We will insert the automated text for the repair you select below just as we do for solitary flaps and grafts. Please note that at this time if you select a location with a different insurance zone you will need to override the ICD10 and CPT if appropriate.

## 2020-12-14 NOTE — BH INPATIENT PSYCHIATRY PROGRESS NOTE - NSICDXBHPRIMARYDX_PSY_ALL_CORE
Bipolar I disorder   F31.9  

## 2020-12-14 NOTE — BH INPATIENT PSYCHIATRY DISCHARGE NOTE - NSDCMRMEDTOKEN_GEN_ALL_CORE_FT
Depakote  mg oral tablet, extended release: 2 tab(s) orally once a day (at bedtime)  Glucophage 500 mg oral tablet: 1 tab(s) orally 2 times a day  KlonoPIN 0.5 mg oral tablet: 1 tab(s) orally 2 times a day MDD:1mg  LaMICtal 150 mg oral tablet: 1 tab(s) orally once a day (at bedtime)  magnesium hydroxide 8% oral suspension: 30 milliliter(s) orally once a day, As needed, Constipation  polyethylene glycol 3350 oral powder for reconstitution: 17 gram(s) orally 2 times a day  propranolol 10 mg oral tablet: 1 tab(s) orally 2 times a day  senna oral tablet: 2 tab(s) orally once a day (at bedtime)  SEROquel 300 mg oral tablet: 1 tab(s) orally once a day (at bedtime)

## 2020-12-14 NOTE — BH INPATIENT PSYCHIATRY DISCHARGE NOTE - HPI (INCLUDE ILLNESS QUALITY, SEVERITY, DURATION, TIMING, CONTEXT, MODIFYING FACTORS, ASSOCIATED SIGNS AND SYMPTOMS)
54 y/o male, single, , lives with wife and 2 adult children, employed as a teacher, history of Bipolar Disorder, multiple past psych admissions (most recent summer 2020), receives psychiatric treatment at [facility], no h/o self-injurious behavior or suicide attempts, no h/o violence or legal issues, PMH Type II DM, no h/o substance abuse, self-presents for worsening depression and anxiety and suicidal ideation.    On interview, pt appears anxious and is shaking his leg up and down. He reports increased depression and anxiety for the past month. He denies acute stressors. He reports feeling restless, pacing, and he is somatically preoccupied. He reports barely eating and is sleeping poorly. He reports difficulty focusing, low energy, anhedonia, and hopelessness. For the past few weeks, he reports suicidal ideation with thoughts of jumping in front of a train or drowning himself. He reports ambivalent intent. He denies manic or psychotic symptoms. He denies homicidal or violent ideation, intent, or plan. He reports medication compliance, denies substance use.

## 2020-12-14 NOTE — BH DISCHARGE NOTE NURSING/SOCIAL WORK/PSYCH REHAB - NSDCPRRECOMMEND_PSY_ALL_CORE
Psychiatric rehabilitation staff recommends that pt continue to explore outpatient psychiatric services and comply with prescribed medication regiment for improved symptom management and sustained recovery as well as support, medication management, and psychotherapy.

## 2020-12-14 NOTE — BH INPATIENT PSYCHIATRY PROGRESS NOTE - NSBHFUPINTERVALHXFT_PSY_A_CORE
No acute events over the weekend. Pt remains compliant with meds, sleeping well, and eating all meals. Reports ongoing improved mood though remains anxious about returning home and tending to tasks (such as paying bills) that he has been avoiding. Says that he does not feel "100%" but agrees that mood is better. Feeling more hopeful and again denying SI/I/P. Continuing to attend groups. Tending to ADLs independently and appropriately. Reports having a partial bowel movement this morning, but remains concerned about constipation.

## 2020-12-14 NOTE — BH INPATIENT PSYCHIATRY DISCHARGE NOTE - NSDCCPCAREPLAN_GEN_ALL_CORE_FT
PRINCIPAL DISCHARGE DIAGNOSIS  Diagnosis: Bipolar 1 disorder  Assessment and Plan of Treatment:

## 2020-12-14 NOTE — BH DISCHARGE NOTE NURSING/SOCIAL WORK/PSYCH REHAB - NSDCPRGOAL_PSY_ALL_CORE
During the course of the current hospitalization, pt has made fair progress towards psychiatric rehabilitation goals. Today, upon discharge, pt is calm, pleasant, and cooperative. Pt stated that "he feels scared and nervous" for discharge, however understands that "the difference of a few days will not change much." Pt asked writer how she feels he was doing upon discharge. Writer provided pt with support and encouragement and reminded pt that we are allowed to be "human" and that we don't have to feel "100%" in order to be ourselves and still be able to succeed and function in society. Writer reminded pt not to be so difficult on himself. Pt was receptive and endorsed feeling "good" to be able to go home, see his family, and his dog, despite "all the responsibilities" at home. Pt stated feeling better upon discharge than upon admission. Pt endorsed "listening to music" and "playing music" as effective and healthy coping skills employed in his daily life which assist him in better managing symptoms of illness. Pt denies SI/HI/I/P as well as psychotic symptoms upon discharge.

## 2020-12-14 NOTE — BH INPATIENT PSYCHIATRY PROGRESS NOTE - NSTXPROBMEDIC_PSY_ALL_CORE
MEDICATION/TREATMENT NON-COMPLIANCE

## 2020-12-14 NOTE — BH DISCHARGE NOTE NURSING/SOCIAL WORK/PSYCH REHAB - NSDCVIVACCINE_GEN_ALL_CORE_FT
Influenza , 2020/12/3 17:56 , Adrienne Wick (RN)  Pneumococcal polysaccharide (PPSV23) , 2020/12/3 17:59 , Adrienne Wick (RN)

## 2020-12-14 NOTE — BH INPATIENT PSYCHIATRY PROGRESS NOTE - NSBHINPTBILLING_PSY_ALL_CORE
03238 - Inpatient Moderate Complexity
30440 - Inpatient Moderate Complexity
08917 - Inpatient Moderate Complexity
53501 - Inpatient Moderate Complexity
05765 - Hospital Discharge Day Management; more than 30 min

## 2020-12-14 NOTE — BH INPATIENT PSYCHIATRY DISCHARGE NOTE - NSBHDCMEDICALFT_PSY_A_CORE
Pt reported constipation throughout hospitalization, treated with Senna, Miralax, Milk of Mg, and Fleet Enema.  Pt continued on home med Metformin 500mg BID for Type II DM.

## 2020-12-14 NOTE — BH INPATIENT PSYCHIATRY DISCHARGE NOTE - HOSPITAL COURSE
Pt initially presented with symptoms of depression, including low mood, anhedonia, poor sleep and appetite, and suicidal thinking. Pt also presented with severe akathisia in the context of recently starting Invega Sustenna during prior hospitalization for manic episode, which he reported contributing to his depression.     On admission, pt's home meds included Invega Sustenna 117mg (last administered 11/17/20), Lamictal 300mg qhs, Seroquel 300mg qhs, Buspar 30mg BID, and Cogentin 0.5mg BID. Given akathisia and pt preference, Invega Sustenna was discontinued and instead Depakote ER was started, titrated to 1000mg qhs (blood level 90). Of note, pt's platelet count was noted to drop from 156 to 137 after initiating Depakote, and repeat CBC (on day of discharge) showed platelet count stable at 136. Pt was informed that he would need to continue to monitor platelet count as an outpatient, with potential need to decrease dose of Depakote. Given P450 interaction between Depakote and Lamictal, dose of Lamictal was halved to 150mg qhs prior to starting Depakote. Seroquel 300mg qhs was continued for bipolar depression (and no change was made given that it had recently been started during prior hospitalization approximately two weeks prior). Buspar 30mg BID was discontinued and pt was started on Klonopin 0.5mg (to address anxiety and akathisia). Pt was also started on Propranolol 10mg BID for akathisia (with goal to discontinue as outpatient in the near future). Cogentin 0.5mg BID was discontinued given no clear indication for such.     Over course of hospitalization, pt's mood improved significantly and became noticeably brighter and more reactive. Suicidal thinking resolved and pt consistently denied SI/I/P. Sleep and appetite also improved significantly. Though pt was initially very isolative to his bedroom, over time he became more interactive with peers and started to consistently attend group therapy sessions. He was compliant with meds and in very good behavioral control. He never became agitated, aggressive, or violent, and he did not exhibit any symptoms of harry. Pt's family were involved with treatment and very supportive.

## 2020-12-14 NOTE — BH INPATIENT PSYCHIATRY DISCHARGE NOTE - NSDCPROCEDURESFT_PSY_ALL_CORE
Platelet count 136 (one of the side effects of Depakote is to potentially decrease platelet count - your platelet count of 136 is a little low and will need to followed/monitored with repeat blood work)

## 2020-12-15 NOTE — SOCIAL WORK POST DISCHARGE FOLLOW UP NOTE - NSBHSWFOLLOWUP_PSY_ALL_CORE_FT
SW communicated with Pt's wife several times regarding letters for Pt's employment.  Pt was provided with return to work letter.  Admittance letter was provided during stay.  SW contacted Pt's wife post discharge to confirm Pt's departure via cab to Pt's home and informed Pt's wife that Pt's diagnosis was written within Pts [employer] for Pt's needs. Pt's wife was receptive and accepted the info provided.

## 2020-12-18 ENCOUNTER — OUTPATIENT (OUTPATIENT)
Dept: OUTPATIENT SERVICES | Facility: HOSPITAL | Age: 55
LOS: 1 days | Discharge: HOME | End: 2020-12-18
Payer: COMMERCIAL

## 2020-12-21 DIAGNOSIS — F31.9 BIPOLAR DISORDER, UNSPECIFIED: ICD-10-CM

## 2021-01-08 PROCEDURE — 99214 OFFICE O/P EST MOD 30 MIN: CPT | Mod: 95

## 2021-02-19 PROCEDURE — 99214 OFFICE O/P EST MOD 30 MIN: CPT | Mod: 95

## 2021-03-26 PROCEDURE — 99214 OFFICE O/P EST MOD 30 MIN: CPT | Mod: 95

## 2021-04-30 PROCEDURE — 99214 OFFICE O/P EST MOD 30 MIN: CPT

## 2021-09-28 PROCEDURE — 99214 OFFICE O/P EST MOD 30 MIN: CPT | Mod: 95

## 2022-01-06 PROCEDURE — 99214 OFFICE O/P EST MOD 30 MIN: CPT | Mod: 95

## 2022-03-19 PROCEDURE — 99214 OFFICE O/P EST MOD 30 MIN: CPT | Mod: 95

## 2022-03-29 PROCEDURE — 99214 OFFICE O/P EST MOD 30 MIN: CPT | Mod: 95

## 2022-05-17 PROCEDURE — 99214 OFFICE O/P EST MOD 30 MIN: CPT | Mod: 95

## 2022-05-25 ENCOUNTER — NON-APPOINTMENT (OUTPATIENT)
Age: 57
End: 2022-05-25

## 2022-05-26 PROCEDURE — 99442: CPT

## 2022-06-09 PROCEDURE — 99214 OFFICE O/P EST MOD 30 MIN: CPT | Mod: 95

## 2022-07-21 PROCEDURE — 98968 PH1 ASSMT&MGMT NQHP 21-30: CPT

## 2022-08-02 PROCEDURE — 99214 OFFICE O/P EST MOD 30 MIN: CPT | Mod: 95

## 2022-08-16 ENCOUNTER — NON-APPOINTMENT (OUTPATIENT)
Age: 57
End: 2022-08-16

## 2022-09-01 PROCEDURE — 99213 OFFICE O/P EST LOW 20 MIN: CPT | Mod: 95

## 2022-10-11 PROCEDURE — 99213 OFFICE O/P EST LOW 20 MIN: CPT | Mod: 95

## 2022-11-22 PROCEDURE — 99213 OFFICE O/P EST LOW 20 MIN: CPT | Mod: 95

## 2023-01-16 ENCOUNTER — NON-APPOINTMENT (OUTPATIENT)
Age: 58
End: 2023-01-16

## 2024-01-19 NOTE — ED PROVIDER NOTE - CLINICAL SUMMARY MEDICAL DECISION MAKING FREE TEXT BOX
Patient transported to CT     Anna Bowers RN  01/19/24 0813     This is a 55 yr old M, pmh bipolar disorder with c/o anxiety and depression. Pt states he just not feeling well, he is desperate to be admitted again. He endorsees he was discharged from [facility] for inpatient psychiatric unit. He states he fooled them, he told he is feeling better but in fact he does not. He states he went home and realized he is just not himself. Pt endorsees he has an out patient follow up tomorrow with [facility].  Collateral information obtained by SW from daughter, please refer to her note. Provider encouraged pt to follow up tomorrow with his scheduled appointment and participate his out patient follow ups. There is no clinical evidence of intoxication, or any acute medical problem requiring immediate intervention.

## 2024-07-24 ENCOUNTER — NON-APPOINTMENT (OUTPATIENT)
Age: 59
End: 2024-07-24

## 2024-09-23 PROCEDURE — 99214 OFFICE O/P EST MOD 30 MIN: CPT | Mod: 95

## 2024-10-08 NOTE — BH PSYCHOLOGY - GROUP THERAPY NOTE - NSHPLANGLIMITEDENGLISH_GEN_A_CORE
Pressure change per OV 10/8/224 made in [facility] and also order was faxed to [employer] via [employer] at [phone].     No